# Patient Record
Sex: FEMALE | Race: WHITE | NOT HISPANIC OR LATINO | Employment: OTHER | URBAN - METROPOLITAN AREA
[De-identification: names, ages, dates, MRNs, and addresses within clinical notes are randomized per-mention and may not be internally consistent; named-entity substitution may affect disease eponyms.]

---

## 2017-02-14 ENCOUNTER — TRANSCRIBE ORDERS (OUTPATIENT)
Dept: ADMINISTRATIVE | Facility: HOSPITAL | Age: 62
End: 2017-02-14

## 2017-02-14 DIAGNOSIS — Z78.0 POSTMENOPAUSAL: Primary | ICD-10-CM

## 2017-02-20 ENCOUNTER — HOSPITAL ENCOUNTER (OUTPATIENT)
Dept: RADIOLOGY | Age: 62
Discharge: HOME | End: 2017-02-20
Payer: COMMERCIAL

## 2017-02-20 DIAGNOSIS — Z12.31 ENCOUNTER FOR SCREENING MAMMOGRAM FOR MALIGNANT NEOPLASM OF BREAST: ICD-10-CM

## 2017-02-20 PROCEDURE — G0202 SCR MAMMO BI INCL CAD: HCPCS

## 2017-02-23 ENCOUNTER — HOSPITAL ENCOUNTER (OUTPATIENT)
Dept: RADIOLOGY | Age: 62
Discharge: HOME | End: 2017-02-23
Payer: COMMERCIAL

## 2017-02-23 DIAGNOSIS — Z78.0 POSTMENOPAUSAL: ICD-10-CM

## 2017-02-23 PROCEDURE — 77080 DXA BONE DENSITY AXIAL: CPT

## 2017-03-30 ENCOUNTER — OFFICE VISIT (OUTPATIENT)
Dept: URGENT CARE | Age: 62
End: 2017-03-30
Payer: COMMERCIAL

## 2017-03-30 PROCEDURE — G0382 LEV 3 HOSP TYPE B ED VISIT: HCPCS

## 2017-03-30 PROCEDURE — S9083 URGENT CARE CENTER GLOBAL: HCPCS

## 2017-08-09 ENCOUNTER — OFFICE VISIT (OUTPATIENT)
Dept: URGENT CARE | Age: 62
End: 2017-08-09
Payer: COMMERCIAL

## 2017-08-09 PROCEDURE — G0382 LEV 3 HOSP TYPE B ED VISIT: HCPCS | Performed by: FAMILY MEDICINE

## 2017-08-09 PROCEDURE — S9083 URGENT CARE CENTER GLOBAL: HCPCS | Performed by: FAMILY MEDICINE

## 2018-01-09 ENCOUNTER — OFFICE VISIT (OUTPATIENT)
Dept: URGENT CARE | Age: 63
End: 2018-01-09
Payer: COMMERCIAL

## 2018-01-09 PROCEDURE — S9083 URGENT CARE CENTER GLOBAL: HCPCS | Performed by: FAMILY MEDICINE

## 2018-01-09 PROCEDURE — G0382 LEV 3 HOSP TYPE B ED VISIT: HCPCS | Performed by: FAMILY MEDICINE

## 2018-01-10 NOTE — PROGRESS NOTES
Assessment   1  Acute upper respiratory infection (465 9) (J06 9)    Plan   Acute upper respiratory infection    · Azithromycin 250 MG Oral Tablet (Zithromax Z-[place]); TAKE 2 TABLETS ON DAY 1    THEN TAKE 1 TABLET A DAY FOR 4 DAYS    Discussion/Summary   Discussion Summary:    Z-pac as directed 2 initially then 1 daily until finished ( please take probiotics)  medication as needed ( try Mucinex DM)  albuterol inhaler as needed  Tylenol as needed  follow-up with family physician as needed  Medication Side Effects Reviewed: Possible side effects of new medications were reviewed with the patient/guardian today  Understands and agrees with treatment plan: The treatment plan was reviewed with the patient/guardian  The patient/guardian understands and agrees with the treatment plan    Counseling Documentation With Imm: The patient was counseled regarding  Follow Up Instructions: Follow Up with your Primary Care Provider in 7-10 days  If your symptoms worsen, go to the nearest [place] [address] Emergency Department  Chief Complaint   1  Cold Symptoms  Chief Complaint Free Text Note Form: c/o nonproductive cough and chest congestion x 1 week , with use of OTC medicaitons-robitussin , tylneol prn  History of Present Illness   HPI: Congestion and cough for the past week - getting worse    Hospital Based Practices Required Assessment:      Pain Assessment      the patient states they do not have pain  Abuse And Domestic Violence Screen       Yes, the patient is safe at home  -- The patient states no one is hurting them  Depression And Suicide Screen  No, the patient has not had thoughts of hurting themself  No, the patient has not felt depressed in the past 7 days  Prefered Language is  english  Review of Systems   Focused-Female:      Constitutional: as noted in HPI       ENT: nasal discharge, but-- as noted in HPI        Cardiovascular: no complaints of slow or fast heart rate, no chest pain, no palpitations, no leg claudication or lower extremity edema  Respiratory: cough, but-- as noted in HPI  Breasts: no complaints of breast pain, breast lump or nipple discharge  Gastrointestinal: no complaints of abdominal pain, no constipation, no nausea or diarrhea, no vomiting, no bloody stools  Genitourinary: no complaints of dysuria, no incontinence, no pelvic pain, no dysmenorrhea, no vaginal discharge or abnormal vaginal bleeding  Musculoskeletal: no complaints of arthralgia, no myalgia, no joint swelling or stiffness, no limb pain or swelling  Integumentary: no complaints of skin rash or lesion, no itching or dry skin, no skin wounds  Neurological: no complaints of headache, no confusion, no numbness or tingling, no dizziness or fainting  ROS Reviewed:    ROS reviewed  Active Problems   1  Acute bronchitis, unspecified organism (466 0) (J20 9)   2  Acute sinusitis (461 9) (J01 90)   3  Anemia (285 9) (D64 9)   4  Asthmatic bronchitis (493 90) (J45 909)   5  Hyperlipidemia (272 4) (E78 5)   6  Hypertension (401 9) (I10)    Past Medical History   1  History of Encounter for routine gynecological examination (V72 31) (Z01 419)   2  History of Encounter for screening mammogram for malignant neoplasm of breast     (V76 12) (Z12 31)   3  History of allergic rhinitis (V12 69) (Z87 09)   4  History of Impaired fasting glucose (790 21) (R73 01)  Active Problems And Past Medical History Reviewed: The active problems and past medical history were reviewed and updated today  Family History   Mother    1  Family history of Mother  At Age 67  Father    2  Family history of Father  At Age 79  Family History    3  Family history of Breast Cancer (V16 3)   4  Family history of Diabetes Mellitus (V18 0)  Family History Reviewed: The family history was reviewed and updated today         Social History    · Denied: History of Alcohol Use (History)   · Denied: History of Caffeine Use   · Denied: History of Drug Use   · Never A Smoker   · Uses Safety Equipment - Seatbelts  Social History Reviewed: The social history was reviewed and updated today  The social history was reviewed and is unchanged  Surgical History   Surgical History Reviewed: The surgical history was reviewed and updated today  Current Meds    1  Aldactone 100 MG Oral Tablet; TAKE 1 TABLET TWICE DAILY; Therapy: (Recorded:08May2012) to Recorded   2  Lipitor 10 MG Oral Tablet; TAKE 1 TABLET DAILY AT BEDTIME; Therapy: (Recorded:08May2012) to Recorded   3  Nadolol 20 MG Oral Tablet; TAKE 1 TABLET DAILY; Therapy: (Federico Randhawa) to Recorded   4  Ventolin  (90 Base) MCG/ACT Inhalation Aerosol Solution; INHALE 2 PUFFS     EVERY 4 HOURS AS NEEDED; Therapy: [id number] to (Last Rx:30Mar2017)  Requested for: [id number] Ordered  Medication List Reviewed: The medication list was reviewed and updated today  Allergies   1  Sulfa Drugs   2  Augmentin TABS   3  Cefzil TABS    Vitals   Signs   Recorded: 01MAC5579 03:51PM   Temperature: 98 8 F, Temporal  Heart Rate: 90  Pulse Quality: Normal  Respiration: 20  Systolic: 579  Diastolic: 70  Weight: 527 lb   BMI Calculated: 43 93  BSA Calculated: 2 12  O2 Saturation: 98  LMP:   Pain Scale: 0    Physical Exam        Constitutional      General appearance: No acute distress, well appearing and well nourished  Ears, Nose, Mouth, and Throat      External inspection of ears and nose: Normal        Otoscopic examination: Tympanic membranes translucent with normal light reflex  Canals patent without erythema  -- slight nasal congestion  -- injection of the oropharynx  Pulmonary      Respiratory effort: No increased work of breathing or signs of respiratory distress  Auscultation of lungs: Clear to auscultation         Cardiovascular      Auscultation of heart: Normal rate and rhythm, normal S1 and S2, without murmurs  Lymphatic      Palpation of lymph nodes in neck: No lymphadenopathy  Skin good color and turgor  Neurologic grossly intact, no nuchal rigidity        Psychiatric      Orientation to person, place, and time: Normal        Mood and affect: Normal        Signatures    Electronically signed by : Mary Carmen Mercer DO; Jan 9 2018  4:03PM EST                       (Author)

## 2018-01-23 VITALS
SYSTOLIC BLOOD PRESSURE: 145 MMHG | WEIGHT: 248 LBS | TEMPERATURE: 98.8 F | DIASTOLIC BLOOD PRESSURE: 70 MMHG | RESPIRATION RATE: 20 BRPM | OXYGEN SATURATION: 98 % | HEART RATE: 90 BPM

## 2018-02-22 ENCOUNTER — TRANSCRIBE ORDERS (OUTPATIENT)
Dept: RADIOLOGY | Facility: CLINIC | Age: 63
End: 2018-02-22

## 2018-02-26 ENCOUNTER — HOSPITAL ENCOUNTER (OUTPATIENT)
Dept: RADIOLOGY | Age: 63
Discharge: HOME | End: 2018-02-26
Payer: COMMERCIAL

## 2018-02-26 DIAGNOSIS — Z12.31 ENCOUNTER FOR SCREENING MAMMOGRAM FOR MALIGNANT NEOPLASM OF BREAST: ICD-10-CM

## 2018-02-26 PROCEDURE — 77063 BREAST TOMOSYNTHESIS BI: CPT

## 2018-02-26 PROCEDURE — 77067 SCR MAMMO BI INCL CAD: CPT

## 2018-12-03 ENCOUNTER — OFFICE VISIT (OUTPATIENT)
Dept: CARDIOLOGY CLINIC | Facility: CLINIC | Age: 63
End: 2018-12-03
Payer: COMMERCIAL

## 2018-12-03 VITALS
HEIGHT: 62 IN | RESPIRATION RATE: 18 BRPM | HEART RATE: 70 BPM | WEIGHT: 260 LBS | SYSTOLIC BLOOD PRESSURE: 130 MMHG | DIASTOLIC BLOOD PRESSURE: 80 MMHG | BODY MASS INDEX: 47.84 KG/M2

## 2018-12-03 DIAGNOSIS — E78.5 HYPERLIPIDEMIA, UNSPECIFIED HYPERLIPIDEMIA TYPE: ICD-10-CM

## 2018-12-03 DIAGNOSIS — R00.2 PALPITATION: ICD-10-CM

## 2018-12-03 DIAGNOSIS — I10 ESSENTIAL HYPERTENSION: Primary | ICD-10-CM

## 2018-12-03 PROCEDURE — 99213 OFFICE O/P EST LOW 20 MIN: CPT | Performed by: INTERNAL MEDICINE

## 2018-12-03 RX ORDER — ALBUTEROL SULFATE 90 UG/1
2 AEROSOL, METERED RESPIRATORY (INHALATION) EVERY 4 HOURS PRN
COMMUNITY
Start: 2017-03-30 | End: 2019-08-12 | Stop reason: ALTCHOICE

## 2018-12-03 RX ORDER — HYDROXYZINE HYDROCHLORIDE 25 MG/1
25 TABLET, FILM COATED ORAL DAILY PRN
COMMUNITY
Start: 2018-07-13 | End: 2019-04-11 | Stop reason: ALTCHOICE

## 2018-12-03 RX ORDER — ATORVASTATIN CALCIUM 10 MG/1
10 TABLET, FILM COATED ORAL
COMMUNITY
Start: 2011-08-19 | End: 2019-08-12 | Stop reason: SDUPTHER

## 2018-12-03 RX ORDER — NADOLOL 20 MG/1
TABLET ORAL
COMMUNITY
Start: 2018-10-15 | End: 2019-08-12 | Stop reason: SDUPTHER

## 2018-12-03 RX ORDER — HYDROCHLOROTHIAZIDE 25 MG/1
25 TABLET ORAL AS NEEDED
COMMUNITY
Start: 2013-10-31 | End: 2019-08-12 | Stop reason: SDUPTHER

## 2018-12-03 RX ORDER — SPIRONOLACTONE 100 MG/1
TABLET, FILM COATED ORAL
COMMUNITY
Start: 2018-10-21 | End: 2018-12-03

## 2018-12-03 RX ORDER — SPIRONOLACTONE 100 MG/1
1 TABLET, FILM COATED ORAL 2 TIMES DAILY
COMMUNITY
End: 2019-08-12 | Stop reason: SDUPTHER

## 2018-12-03 NOTE — LETTER
December 3, 2018     Lucia Moe, [address] [address]    Patient: Asmita Ramos   YOB: 1955   Date of Visit: 12/3/2018       Dear Dr Yo November: Thank you for referring Yonas Ferrell to me for evaluation  Below are my notes for this consultation  If you have questions, please do not hesitate to call me  I look forward to following your patient along with you           Sincerely,        Mary Ochoa MD        CC: No Recipients

## 2018-12-03 NOTE — PROGRESS NOTES
Assessment/Plan:    No problem-specific Assessment & Plan notes found for this encounter  Diagnoses and all orders for this visit:    Essential hypertension    Hyperlipidemia, unspecified hyperlipidemia type    Palpitation    Other orders  -     spironolactone (ALDACTONE) 100 mg tablet; Take 1 tablet by mouth 2 (two) times a day  -     atorvastatin (LIPITOR) 10 mg tablet; Take 10 mg by mouth  -     hydrochlorothiazide (HYDRODIURIL) 25 mg tablet; Take 25 mg by mouth  -     hydrOXYzine HCL (ATARAX) 25 mg tablet; Take 25 mg by mouth daily as needed  -     nadolol (CORGARD) 20 mg tablet;   -     Discontinue: spironolactone (ALDACTONE) 100 mg tablet;   -     albuterol (VENTOLIN HFA) 90 mcg/act inhaler; Inhale 2 puffs every 4 (four) hours as needed          Subjective:  Feels well but for some arthritic pain  Patient ID: Belkys Becker is a 58 y o  female  Patient presented to this office for cardiac follow-up  She has a history of hypertension and hyperlipidemia as well as symptoms of palpitations related to possibly sinus tachycardia  She denies any symptoms of chest pain, shortness of breath, palpitations, dizziness or lightheadedness  She has no leg edema  Hypertension   Pertinent negatives include no chest pain, palpitations or shortness of breath  Hyperlipidemia   Pertinent negatives include no chest pain or shortness of breath  The following portions of the patient's history were reviewed and updated as appropriate: allergies, current medications, past family history, past medical history, past social history, past surgical history and problem list     Review of Systems   Respiratory: Negative for apnea, cough, chest tightness, shortness of breath and wheezing  Cardiovascular: Negative for chest pain, palpitations and leg swelling  Gastrointestinal: Negative for abdominal pain  Neurological: Negative for dizziness and light-headedness           Objective:  Stable cardiac-wise  /80   Pulse 70   Resp 18   Ht 5' 2" (1 575 m)   Wt 118 kg (260 lb)   BMI 47 55 kg/m²          Physical Exam   Constitutional: She is oriented to person, place, and time  She appears well-developed and well-nourished  No distress  HENT:   Head: Normocephalic and atraumatic  Neck: Normal range of motion  Neck supple  No JVD present  No thyromegaly present  Cardiovascular: Normal rate, regular rhythm, S1 normal and S2 normal   Exam reveals no gallop and no friction rub  No murmur heard  Pulmonary/Chest: Effort normal  No respiratory distress  She has no wheezes  She has no rales  She exhibits no tenderness  Abdominal: Soft  Musculoskeletal: She exhibits no edema  Neurological: She is alert and oriented to person, place, and time  Skin: Skin is warm and dry  She is not diaphoretic  Vitals reviewed

## 2019-04-11 ENCOUNTER — OFFICE VISIT (OUTPATIENT)
Dept: CARDIOLOGY CLINIC | Facility: CLINIC | Age: 64
End: 2019-04-11
Payer: COMMERCIAL

## 2019-04-11 VITALS
RESPIRATION RATE: 18 BRPM | BODY MASS INDEX: 48.69 KG/M2 | HEART RATE: 70 BPM | DIASTOLIC BLOOD PRESSURE: 80 MMHG | HEIGHT: 62 IN | SYSTOLIC BLOOD PRESSURE: 125 MMHG | WEIGHT: 264.6 LBS

## 2019-04-11 DIAGNOSIS — I10 ESSENTIAL HYPERTENSION: ICD-10-CM

## 2019-04-11 DIAGNOSIS — E78.2 MIXED HYPERLIPIDEMIA: Primary | ICD-10-CM

## 2019-04-11 DIAGNOSIS — R00.2 PALPITATION: ICD-10-CM

## 2019-04-11 PROCEDURE — 99213 OFFICE O/P EST LOW 20 MIN: CPT | Performed by: INTERNAL MEDICINE

## 2019-04-13 ENCOUNTER — APPOINTMENT (OUTPATIENT)
Dept: LAB | Age: 64
End: 2019-04-13
Payer: COMMERCIAL

## 2019-04-13 DIAGNOSIS — R00.2 PALPITATION: ICD-10-CM

## 2019-04-13 DIAGNOSIS — E78.5 HYPERLIPIDEMIA, UNSPECIFIED HYPERLIPIDEMIA TYPE: ICD-10-CM

## 2019-04-13 DIAGNOSIS — I10 ESSENTIAL HYPERTENSION: ICD-10-CM

## 2019-04-13 LAB
ALBUMIN SERPL BCP-MCNC: 3.5 G/DL (ref 3.5–5)
ALP SERPL-CCNC: 79 U/L (ref 46–116)
ALT SERPL W P-5'-P-CCNC: 16 U/L (ref 12–78)
ANION GAP SERPL CALCULATED.3IONS-SCNC: 2 MMOL/L (ref 4–13)
AST SERPL W P-5'-P-CCNC: 13 U/L (ref 5–45)
BASOPHILS # BLD AUTO: 0.09 THOUSANDS/ΜL (ref 0–0.1)
BASOPHILS NFR BLD AUTO: 2 % (ref 0–1)
BILIRUB SERPL-MCNC: 0.54 MG/DL (ref 0.2–1)
BUN SERPL-MCNC: 15 MG/DL (ref 5–25)
CALCIUM SERPL-MCNC: 8 MG/DL (ref 8.3–10.1)
CHLORIDE SERPL-SCNC: 107 MMOL/L (ref 100–108)
CHOLEST SERPL-MCNC: 128 MG/DL (ref 50–200)
CO2 SERPL-SCNC: 27 MMOL/L (ref 21–32)
CREAT SERPL-MCNC: 0.89 MG/DL (ref 0.6–1.3)
EOSINOPHIL # BLD AUTO: 0.1 THOUSAND/ΜL (ref 0–0.61)
EOSINOPHIL NFR BLD AUTO: 2 % (ref 0–6)
ERYTHROCYTE [DISTWIDTH] IN BLOOD BY AUTOMATED COUNT: 12.9 % (ref 11.6–15.1)
GFR SERPL CREATININE-BSD FRML MDRD: 69 ML/MIN/1.73SQ M
GLUCOSE P FAST SERPL-MCNC: 96 MG/DL (ref 65–99)
HCT VFR BLD AUTO: 44.1 % (ref 34.8–46.1)
HDLC SERPL-MCNC: 46 MG/DL (ref 40–60)
HGB BLD-MCNC: 14 G/DL (ref 11.5–15.4)
IMM GRANULOCYTES # BLD AUTO: 0.01 THOUSAND/UL (ref 0–0.2)
IMM GRANULOCYTES NFR BLD AUTO: 0 % (ref 0–2)
LDLC SERPL CALC-MCNC: 68 MG/DL (ref 0–100)
LYMPHOCYTES # BLD AUTO: 1.81 THOUSANDS/ΜL (ref 0.6–4.47)
LYMPHOCYTES NFR BLD AUTO: 34 % (ref 14–44)
MCH RBC QN AUTO: 29.2 PG (ref 26.8–34.3)
MCHC RBC AUTO-ENTMCNC: 31.7 G/DL (ref 31.4–37.4)
MCV RBC AUTO: 92 FL (ref 82–98)
MONOCYTES # BLD AUTO: 0.53 THOUSAND/ΜL (ref 0.17–1.22)
MONOCYTES NFR BLD AUTO: 10 % (ref 4–12)
NEUTROPHILS # BLD AUTO: 2.75 THOUSANDS/ΜL (ref 1.85–7.62)
NEUTS SEG NFR BLD AUTO: 52 % (ref 43–75)
NRBC BLD AUTO-RTO: 0 /100 WBCS
PLATELET # BLD AUTO: 215 THOUSANDS/UL (ref 149–390)
PMV BLD AUTO: 11.3 FL (ref 8.9–12.7)
POTASSIUM SERPL-SCNC: 4.2 MMOL/L (ref 3.5–5.3)
PROT SERPL-MCNC: 6.6 G/DL (ref 6.4–8.2)
RBC # BLD AUTO: 4.79 MILLION/UL (ref 3.81–5.12)
SODIUM SERPL-SCNC: 136 MMOL/L (ref 136–145)
TRIGL SERPL-MCNC: 68 MG/DL
TSH 30M P TRH SERPL-ACNC: 1.7 UIU/ML
TSH SERPL DL<=0.05 MIU/L-ACNC: 1.64 UIU/ML
WBC # BLD AUTO: 5.29 THOUSAND/UL (ref 4.31–10.16)

## 2019-04-13 PROCEDURE — 84443 ASSAY THYROID STIM HORMONE: CPT

## 2019-04-13 PROCEDURE — 80053 COMPREHEN METABOLIC PANEL: CPT

## 2019-04-13 PROCEDURE — 85025 COMPLETE CBC W/AUTO DIFF WBC: CPT

## 2019-04-13 PROCEDURE — 36415 COLL VENOUS BLD VENIPUNCTURE: CPT

## 2019-04-13 PROCEDURE — 80061 LIPID PANEL: CPT

## 2019-04-19 ENCOUNTER — HOSPITAL ENCOUNTER (OUTPATIENT)
Dept: RADIOLOGY | Age: 64
Discharge: HOME | End: 2019-04-19
Payer: COMMERCIAL

## 2019-04-19 VITALS — HEIGHT: 62 IN | WEIGHT: 264 LBS | BODY MASS INDEX: 48.58 KG/M2

## 2019-04-19 DIAGNOSIS — Z12.31 ENCOUNTER FOR SCREENING MAMMOGRAM FOR MALIGNANT NEOPLASM OF BREAST: ICD-10-CM

## 2019-04-19 PROCEDURE — 77063 BREAST TOMOSYNTHESIS BI: CPT

## 2019-04-19 PROCEDURE — 77067 SCR MAMMO BI INCL CAD: CPT

## 2019-08-12 ENCOUNTER — OFFICE VISIT (OUTPATIENT)
Dept: CARDIOLOGY CLINIC | Facility: CLINIC | Age: 64
End: 2019-08-12
Payer: COMMERCIAL

## 2019-08-12 VITALS
BODY MASS INDEX: 46.85 KG/M2 | RESPIRATION RATE: 18 BRPM | HEIGHT: 62 IN | DIASTOLIC BLOOD PRESSURE: 80 MMHG | WEIGHT: 254.6 LBS | HEART RATE: 60 BPM | SYSTOLIC BLOOD PRESSURE: 130 MMHG

## 2019-08-12 DIAGNOSIS — R00.2 PALPITATION: ICD-10-CM

## 2019-08-12 DIAGNOSIS — E78.2 MIXED HYPERLIPIDEMIA: ICD-10-CM

## 2019-08-12 DIAGNOSIS — I10 ESSENTIAL HYPERTENSION: Primary | ICD-10-CM

## 2019-08-12 PROCEDURE — 99213 OFFICE O/P EST LOW 20 MIN: CPT | Performed by: INTERNAL MEDICINE

## 2019-08-12 RX ORDER — NADOLOL 20 MG/1
20 TABLET ORAL DAILY
Qty: 90 TABLET | Refills: 3 | Status: SHIPPED | OUTPATIENT
Start: 2019-08-12 | End: 2020-01-14 | Stop reason: SDUPTHER

## 2019-08-12 RX ORDER — HYDROCHLOROTHIAZIDE 25 MG/1
25 TABLET ORAL DAILY
Qty: 30 TABLET | Refills: 6 | Status: SHIPPED | OUTPATIENT
Start: 2019-08-12 | End: 2020-01-14 | Stop reason: SDUPTHER

## 2019-08-12 RX ORDER — SPIRONOLACTONE 100 MG/1
100 TABLET, FILM COATED ORAL 2 TIMES DAILY
Qty: 180 TABLET | Refills: 3 | Status: SHIPPED | OUTPATIENT
Start: 2019-08-12 | End: 2020-01-14 | Stop reason: SDUPTHER

## 2019-08-12 RX ORDER — ATORVASTATIN CALCIUM 10 MG/1
10 TABLET, FILM COATED ORAL DAILY
Qty: 90 TABLET | Refills: 3 | Status: SHIPPED | OUTPATIENT
Start: 2019-08-12 | End: 2020-01-14 | Stop reason: SDUPTHER

## 2019-08-12 NOTE — ASSESSMENT & PLAN NOTE
History of palpitation, stable the patient is currently asymptomatic  Will continue nadolol at 20 mg daily

## 2019-08-12 NOTE — PROGRESS NOTES
Assessment/Plan:    Essential hypertension  Hypertension, stable and adequately controlled  We will continue present regimen of medications  Palpitation  History of palpitation, stable the patient is currently asymptomatic  Will continue nadolol at 20 mg daily  Hyperlipidemia  Hyperlipidemia, stable  We will continue atorvastatin 10 mg daily  Diagnoses and all orders for this visit:    Essential hypertension  -     atorvastatin (LIPITOR) 10 mg tablet; Take 1 tablet (10 mg total) by mouth daily  -     hydrochlorothiazide (HYDRODIURIL) 25 mg tablet; Take 1 tablet (25 mg total) by mouth daily  -     nadolol (CORGARD) 20 mg tablet; Take 1 tablet (20 mg total) by mouth daily  -     spironolactone (ALDACTONE) 100 mg tablet; Take 1 tablet (100 mg total) by mouth 2 (two) times a day    Mixed hyperlipidemia  -     atorvastatin (LIPITOR) 10 mg tablet; Take 1 tablet (10 mg total) by mouth daily  -     hydrochlorothiazide (HYDRODIURIL) 25 mg tablet; Take 1 tablet (25 mg total) by mouth daily  -     nadolol (CORGARD) 20 mg tablet; Take 1 tablet (20 mg total) by mouth daily  -     spironolactone (ALDACTONE) 100 mg tablet; Take 1 tablet (100 mg total) by mouth 2 (two) times a day    Palpitation          Subjective:  Feels well  Patient ID: Gayla Toure is a 58 y o  female  Patient presented to this office for the purpose of cardiac follow-up  She has a history of hypertension and hyperlipidemia as well as symptoms of palpitation  She has been feeling well from the cardiac standpoint denying any symptoms of chest pain, shortness of breath, palpitation, dizziness or lightheadedness  She has no significant leg edema        The following portions of the patient's history were reviewed and updated as appropriate: allergies, current medications, past family history, past medical history, past social history, past surgical history and problem list     Review of Systems   Respiratory: Negative for apnea, cough, chest tightness, shortness of breath and wheezing  Cardiovascular: Negative for chest pain, palpitations and leg swelling  Gastrointestinal: Negative for abdominal pain  Neurological: Negative for dizziness and light-headedness  Objective:  Stable cardiac-wise  /80 (BP Location: Right arm, Patient Position: Sitting)   Pulse 60   Resp 18   Ht 5' 2" (1 575 m)   Wt 115 kg (254 lb 9 6 oz)   BMI 46 57 kg/m²          Physical Exam   Constitutional: She is oriented to person, place, and time  She appears well-developed and well-nourished  No distress  HENT:   Head: Normocephalic and atraumatic  Neck: Normal range of motion  Neck supple  No JVD present  No thyromegaly present  Cardiovascular: Normal rate, regular rhythm, S1 normal and S2 normal  Exam reveals no gallop and no friction rub  No murmur heard  Pulmonary/Chest: Effort normal  No respiratory distress  She has no wheezes  She has no rales  She exhibits no tenderness  Abdominal: Soft  Musculoskeletal: She exhibits no edema  Neurological: She is alert and oriented to person, place, and time  Skin: Skin is warm and dry  She is not diaphoretic  Psychiatric: She has a normal mood and affect  Vitals reviewed

## 2019-08-12 NOTE — LETTER
August 12, 2019     Rocky Davidson, [address] [id number]    Patient: Luz Dickson   YOB: 1955   Date of Visit: 8/12/2019       Dear Dr Sona Castillo: Thank you for referring Meredith Florian to me for evaluation  Below are my notes for this consultation  If you have questions, please do not hesitate to call me  I look forward to following your patient along with you  Sincerely,        Ramakrishna Crane MD        CC: No Recipients  Ramakrishna Crane MD  8/12/2019 11:37 AM  Sign at close encounter  Assessment/Plan:    Essential hypertension  Hypertension, stable and adequately controlled  We will continue present regimen of medications  Palpitation  History of palpitation, stable the patient is currently asymptomatic  Will continue nadolol at 20 mg daily  Hyperlipidemia  Hyperlipidemia, stable  We will continue atorvastatin 10 mg daily  Diagnoses and all orders for this visit:    Essential hypertension  -     atorvastatin (LIPITOR) 10 mg tablet; Take 1 tablet (10 mg total) by mouth daily  -     hydrochlorothiazide (HYDRODIURIL) 25 mg tablet; Take 1 tablet (25 mg total) by mouth daily  -     nadolol (CORGARD) 20 mg tablet; Take 1 tablet (20 mg total) by mouth daily  -     spironolactone (ALDACTONE) 100 mg tablet; Take 1 tablet (100 mg total) by mouth 2 (two) times a day    Mixed hyperlipidemia  -     atorvastatin (LIPITOR) 10 mg tablet; Take 1 tablet (10 mg total) by mouth daily  -     hydrochlorothiazide (HYDRODIURIL) 25 mg tablet; Take 1 tablet (25 mg total) by mouth daily  -     nadolol (CORGARD) 20 mg tablet; Take 1 tablet (20 mg total) by mouth daily  -     spironolactone (ALDACTONE) 100 mg tablet; Take 1 tablet (100 mg total) by mouth 2 (two) times a day    Palpitation          Subjective:  Feels well  Patient ID: Luz Dickson is a 58 y o  female  Patient presented to this office for the purpose of cardiac follow-up    She has a history of hypertension and hyperlipidemia as well as symptoms of palpitation  She has been feeling well from the cardiac standpoint denying any symptoms of chest pain, shortness of breath, palpitation, dizziness or lightheadedness  She has no significant leg edema  The following portions of the patient's history were reviewed and updated as appropriate: allergies, current medications, past family history, past medical history, past social history, past surgical history and problem list     Review of Systems   Respiratory: Negative for apnea, cough, chest tightness, shortness of breath and wheezing  Cardiovascular: Negative for chest pain, palpitations and leg swelling  Gastrointestinal: Negative for abdominal pain  Neurological: Negative for dizziness and light-headedness  Objective:  Stable cardiac-wise  /80 (BP Location: Right arm, Patient Position: Sitting)   Pulse 60   Resp 18   Ht 5' 2" (1 575 m)   Wt 115 kg (254 lb 9 6 oz)   BMI 46 57 kg/m²           Physical Exam   Constitutional: She is oriented to person, place, and time  She appears well-developed and well-nourished  No distress  HENT:   Head: Normocephalic and atraumatic  Neck: Normal range of motion  Neck supple  No JVD present  No thyromegaly present  Cardiovascular: Normal rate, regular rhythm, S1 normal and S2 normal  Exam reveals no gallop and no friction rub  No murmur heard  Pulmonary/Chest: Effort normal  No respiratory distress  She has no wheezes  She has no rales  She exhibits no tenderness  Abdominal: Soft  Musculoskeletal: She exhibits no edema  Neurological: She is alert and oriented to person, place, and time  Skin: Skin is warm and dry  She is not diaphoretic  Psychiatric: She has a normal mood and affect  Vitals reviewed

## 2019-08-12 NOTE — LETTER
August 12, 2019     Waldo Andrea, [address] [id number]    Patient: Lei Santiago   YOB: 1955   Date of Visit: 8/12/2019       Dear Dr Nara Pak Recipients: Thank you for referring Jose Monique to me for evaluation  Below are my notes for this consultation  If you have questions, please do not hesitate to call me  I look forward to following your patient along with you  Sincerely,        Lei Ku MD        CC: No Recipients  Lei Ku MD  8/12/2019 11:37 AM  Sign at close encounter  Assessment/Plan:    Essential hypertension  Hypertension, stable and adequately controlled  We will continue present regimen of medications  Palpitation  History of palpitation, stable the patient is currently asymptomatic  Will continue nadolol at 20 mg daily  Hyperlipidemia  Hyperlipidemia, stable  We will continue atorvastatin 10 mg daily  Diagnoses and all orders for this visit:    Essential hypertension  -     atorvastatin (LIPITOR) 10 mg tablet; Take 1 tablet (10 mg total) by mouth daily  -     hydrochlorothiazide (HYDRODIURIL) 25 mg tablet; Take 1 tablet (25 mg total) by mouth daily  -     nadolol (CORGARD) 20 mg tablet; Take 1 tablet (20 mg total) by mouth daily  -     spironolactone (ALDACTONE) 100 mg tablet; Take 1 tablet (100 mg total) by mouth 2 (two) times a day    Mixed hyperlipidemia  -     atorvastatin (LIPITOR) 10 mg tablet; Take 1 tablet (10 mg total) by mouth daily  -     hydrochlorothiazide (HYDRODIURIL) 25 mg tablet; Take 1 tablet (25 mg total) by mouth daily  -     nadolol (CORGARD) 20 mg tablet; Take 1 tablet (20 mg total) by mouth daily  -     spironolactone (ALDACTONE) 100 mg tablet; Take 1 tablet (100 mg total) by mouth 2 (two) times a day    Palpitation          Subjective:  Feels well  Patient ID: Lei Santiago is a 58 y o  female  Patient presented to this office for the purpose of cardiac follow-up    She has a history of hypertension and hyperlipidemia as well as symptoms of palpitation  She has been feeling well from the cardiac standpoint denying any symptoms of chest pain, shortness of breath, palpitation, dizziness or lightheadedness  She has no significant leg edema  The following portions of the patient's history were reviewed and updated as appropriate: allergies, current medications, past family history, past medical history, past social history, past surgical history and problem list     Review of Systems   Respiratory: Negative for apnea, cough, chest tightness, shortness of breath and wheezing  Cardiovascular: Negative for chest pain, palpitations and leg swelling  Gastrointestinal: Negative for abdominal pain  Neurological: Negative for dizziness and light-headedness  Objective:  Stable cardiac-wise  /80 (BP Location: Right arm, Patient Position: Sitting)   Pulse 60   Resp 18   Ht 5' 2" (1 575 m)   Wt 115 kg (254 lb 9 6 oz)   BMI 46 57 kg/m²           Physical Exam   Constitutional: She is oriented to person, place, and time  She appears well-developed and well-nourished  No distress  HENT:   Head: Normocephalic and atraumatic  Neck: Normal range of motion  Neck supple  No JVD present  No thyromegaly present  Cardiovascular: Normal rate, regular rhythm, S1 normal and S2 normal  Exam reveals no gallop and no friction rub  No murmur heard  Pulmonary/Chest: Effort normal  No respiratory distress  She has no wheezes  She has no rales  She exhibits no tenderness  Abdominal: Soft  Musculoskeletal: She exhibits no edema  Neurological: She is alert and oriented to person, place, and time  Skin: Skin is warm and dry  She is not diaphoretic  Psychiatric: She has a normal mood and affect  Vitals reviewed

## 2020-01-08 ENCOUNTER — TRANSCRIBE ORDERS (OUTPATIENT)
Dept: ADMINISTRATIVE | Age: 65
End: 2020-01-08

## 2020-01-08 ENCOUNTER — APPOINTMENT (OUTPATIENT)
Dept: LAB | Age: 65
End: 2020-01-08
Payer: MEDICARE

## 2020-01-08 DIAGNOSIS — R73.03 BORDERLINE DIABETES MELLITUS: ICD-10-CM

## 2020-01-08 DIAGNOSIS — E28.2 POLYCYSTIC OVARIES: Primary | ICD-10-CM

## 2020-01-08 DIAGNOSIS — E28.2 POLYCYSTIC OVARIES: ICD-10-CM

## 2020-01-08 LAB
ALBUMIN SERPL BCP-MCNC: 3.5 G/DL (ref 3.5–5)
ALP SERPL-CCNC: 86 U/L (ref 46–116)
ALT SERPL W P-5'-P-CCNC: 16 U/L (ref 12–78)
ANION GAP SERPL CALCULATED.3IONS-SCNC: 5 MMOL/L (ref 4–13)
AST SERPL W P-5'-P-CCNC: 15 U/L (ref 5–45)
BILIRUB SERPL-MCNC: 0.59 MG/DL (ref 0.2–1)
BUN SERPL-MCNC: 15 MG/DL (ref 5–25)
CALCIUM SERPL-MCNC: 9.2 MG/DL (ref 8.3–10.1)
CHLORIDE SERPL-SCNC: 107 MMOL/L (ref 100–108)
CHOLEST SERPL-MCNC: 125 MG/DL (ref 50–200)
CO2 SERPL-SCNC: 26 MMOL/L (ref 21–32)
CREAT SERPL-MCNC: 0.91 MG/DL (ref 0.6–1.3)
EST. AVERAGE GLUCOSE BLD GHB EST-MCNC: 111 MG/DL
GFR SERPL CREATININE-BSD FRML MDRD: 66 ML/MIN/1.73SQ M
GLUCOSE P FAST SERPL-MCNC: 98 MG/DL (ref 65–99)
HBA1C MFR BLD: 5.5 % (ref 4.2–6.3)
HDLC SERPL-MCNC: 41 MG/DL
LDLC SERPL CALC-MCNC: 67 MG/DL (ref 0–100)
NONHDLC SERPL-MCNC: 84 MG/DL
POTASSIUM SERPL-SCNC: 3.8 MMOL/L (ref 3.5–5.3)
PROT SERPL-MCNC: 7.1 G/DL (ref 6.4–8.2)
SODIUM SERPL-SCNC: 138 MMOL/L (ref 136–145)
TRIGL SERPL-MCNC: 85 MG/DL

## 2020-01-08 PROCEDURE — 83036 HEMOGLOBIN GLYCOSYLATED A1C: CPT

## 2020-01-08 PROCEDURE — 80061 LIPID PANEL: CPT

## 2020-01-08 PROCEDURE — 36415 COLL VENOUS BLD VENIPUNCTURE: CPT

## 2020-01-08 PROCEDURE — 80053 COMPREHEN METABOLIC PANEL: CPT

## 2020-01-14 ENCOUNTER — OFFICE VISIT (OUTPATIENT)
Dept: CARDIOLOGY CLINIC | Facility: CLINIC | Age: 65
End: 2020-01-14
Payer: MEDICARE

## 2020-01-14 VITALS
BODY MASS INDEX: 46.45 KG/M2 | RESPIRATION RATE: 18 BRPM | SYSTOLIC BLOOD PRESSURE: 135 MMHG | HEIGHT: 62 IN | DIASTOLIC BLOOD PRESSURE: 80 MMHG | WEIGHT: 252.4 LBS | HEART RATE: 70 BPM | OXYGEN SATURATION: 95 %

## 2020-01-14 DIAGNOSIS — R00.2 PALPITATION: ICD-10-CM

## 2020-01-14 DIAGNOSIS — I10 ESSENTIAL HYPERTENSION: Primary | ICD-10-CM

## 2020-01-14 DIAGNOSIS — E78.2 MIXED HYPERLIPIDEMIA: ICD-10-CM

## 2020-01-14 PROCEDURE — 99213 OFFICE O/P EST LOW 20 MIN: CPT | Performed by: INTERNAL MEDICINE

## 2020-01-14 RX ORDER — HYDROCHLOROTHIAZIDE 25 MG/1
25 TABLET ORAL DAILY
Qty: 30 TABLET | Refills: 6 | Status: SHIPPED | OUTPATIENT
Start: 2020-01-14 | End: 2020-12-28 | Stop reason: SDUPTHER

## 2020-01-14 RX ORDER — NADOLOL 20 MG/1
20 TABLET ORAL DAILY
Qty: 90 TABLET | Refills: 3 | Status: SHIPPED | OUTPATIENT
Start: 2020-01-14 | End: 2020-12-28 | Stop reason: SDUPTHER

## 2020-01-14 RX ORDER — ATORVASTATIN CALCIUM 10 MG/1
10 TABLET, FILM COATED ORAL DAILY
Qty: 90 TABLET | Refills: 3 | Status: SHIPPED | OUTPATIENT
Start: 2020-01-14 | End: 2020-12-28 | Stop reason: SDUPTHER

## 2020-01-14 RX ORDER — SPIRONOLACTONE 100 MG/1
100 TABLET, FILM COATED ORAL 2 TIMES DAILY
Qty: 180 TABLET | Refills: 3 | Status: SHIPPED | OUTPATIENT
Start: 2020-01-14 | End: 2020-12-28 | Stop reason: SDUPTHER

## 2020-01-14 NOTE — LETTER
January 14, 2020     Referral [address] [place] [id number]    Patient: Carolina Yuan   YOB: 1955   Date of Visit: 1/14/2020       Dear Dr Leonel Guy:    Thank you for referring Julio C Moreno to me for evaluation  Below are my notes for this consultation  If you have questions, please do not hesitate to call me  I look forward to following your patient along with you  Sincerely,        Pardeep Mejia MD        CC: DO Pardeep Peralta MD  1/14/2020 11:29 AM  Sign at close encounter  Assessment/Plan:    Essential hypertension  Hypertension, stable adequately controlled  We will continue present medications  Palpitation  History of palpitation, stable  The patient will continue nadolol at 20 mg daily  Hyperlipidemia  Hyperlipidemia, stable  The patient will continue atorvastatin at 10 mg daily  Diagnoses and all orders for this visit:    Essential hypertension  -     atorvastatin (LIPITOR) 10 mg tablet; Take 1 tablet (10 mg total) by mouth daily  -     hydrochlorothiazide (HYDRODIURIL) 25 mg tablet; Take 1 tablet (25 mg total) by mouth daily  -     nadolol (CORGARD) 20 mg tablet; Take 1 tablet (20 mg total) by mouth daily  -     spironolactone (ALDACTONE) 100 mg tablet; Take 1 tablet (100 mg total) by mouth 2 (two) times a day    Mixed hyperlipidemia  -     atorvastatin (LIPITOR) 10 mg tablet; Take 1 tablet (10 mg total) by mouth daily  -     hydrochlorothiazide (HYDRODIURIL) 25 mg tablet; Take 1 tablet (25 mg total) by mouth daily  -     nadolol (CORGARD) 20 mg tablet; Take 1 tablet (20 mg total) by mouth daily  -     spironolactone (ALDACTONE) 100 mg tablet; Take 1 tablet (100 mg total) by mouth 2 (two) times a day    Palpitation          Subjective:  Feels generally well  Patient ID: Carolina Yuan is a 71 y o  female  The patient presented to this office for the purpose of cardiac follow-up    She has a known history of hypertension hyperlipidemia symptoms of palpitation  She has been feeling well standpoint denying any symptoms chest pain shortness of palpitation, dizziness or lightheadedness  She has no significant leg edema  The following portions of the patient's history were reviewed and updated as appropriate: allergies, current medications, past family history, past medical history, past social history, past surgical history and problem list     Review of Systems   Respiratory: Negative for apnea, cough, chest tightness, shortness of breath and wheezing  Cardiovascular: Negative for chest pain, palpitations and leg swelling  Gastrointestinal: Negative for abdominal pain  Neurological: Negative for dizziness and light-headedness  Hematological: Negative  Psychiatric/Behavioral: Negative  Objective:  Stable cardiac-wise  /80 (BP Location: Right arm, Patient Position: Sitting)   Pulse 70   Resp 18   Ht 5' 2" (1 575 m)   Wt 114 kg (252 lb 6 4 oz) Comment: with shoes  SpO2 95%   BMI 46 16 kg/m²           Physical Exam   Constitutional: She is oriented to person, place, and time  She appears well-developed and well-nourished  No distress  HENT:   Head: Normocephalic and atraumatic  Neck: Normal range of motion  Neck supple  No JVD present  No thyromegaly present  Cardiovascular: Normal rate, regular rhythm, S1 normal and S2 normal  Exam reveals no gallop and no friction rub  No murmur heard  Pulmonary/Chest: Effort normal  No respiratory distress  She has no wheezes  She has no rales  She exhibits no tenderness  Abdominal: Soft  Musculoskeletal: She exhibits no edema  Neurological: She is alert and oriented to person, place, and time  Skin: Skin is warm and dry  She is not diaphoretic  Psychiatric: She has a normal mood and affect  Vitals reviewed

## 2020-01-14 NOTE — PROGRESS NOTES
Assessment/Plan:    Essential hypertension  Hypertension, stable adequately controlled  We will continue present medications  Palpitation  History of palpitation, stable  The patient will continue nadolol at 20 mg daily  Hyperlipidemia  Hyperlipidemia, stable  The patient will continue atorvastatin at 10 mg daily  Diagnoses and all orders for this visit:    Essential hypertension  -     atorvastatin (LIPITOR) 10 mg tablet; Take 1 tablet (10 mg total) by mouth daily  -     hydrochlorothiazide (HYDRODIURIL) 25 mg tablet; Take 1 tablet (25 mg total) by mouth daily  -     nadolol (CORGARD) 20 mg tablet; Take 1 tablet (20 mg total) by mouth daily  -     spironolactone (ALDACTONE) 100 mg tablet; Take 1 tablet (100 mg total) by mouth 2 (two) times a day    Mixed hyperlipidemia  -     atorvastatin (LIPITOR) 10 mg tablet; Take 1 tablet (10 mg total) by mouth daily  -     hydrochlorothiazide (HYDRODIURIL) 25 mg tablet; Take 1 tablet (25 mg total) by mouth daily  -     nadolol (CORGARD) 20 mg tablet; Take 1 tablet (20 mg total) by mouth daily  -     spironolactone (ALDACTONE) 100 mg tablet; Take 1 tablet (100 mg total) by mouth 2 (two) times a day    Palpitation          Subjective:  Feels generally well  Patient ID: Charlie Osuna is a 71 y o  female  The patient presented to this office for the purpose of cardiac follow-up  She has a known history of hypertension hyperlipidemia symptoms of palpitation  She has been feeling well standpoint denying any symptoms chest pain shortness of palpitation, dizziness or lightheadedness  She has no significant leg edema  The following portions of the patient's history were reviewed and updated as appropriate: allergies, current medications, past family history, past medical history, past social history, past surgical history and problem list     Review of Systems   Respiratory: Negative for apnea, cough, chest tightness, shortness of breath and wheezing  Cardiovascular: Negative for chest pain, palpitations and leg swelling  Gastrointestinal: Negative for abdominal pain  Neurological: Negative for dizziness and light-headedness  Hematological: Negative  Psychiatric/Behavioral: Negative  Objective:  Stable cardiac-wise  /80 (BP Location: Right arm, Patient Position: Sitting)   Pulse 70   Resp 18   Ht 5' 2" (1 575 m)   Wt 114 kg (252 lb 6 4 oz) Comment: with shoes  SpO2 95%   BMI 46 16 kg/m²          Physical Exam   Constitutional: She is oriented to person, place, and time  She appears well-developed and well-nourished  No distress  HENT:   Head: Normocephalic and atraumatic  Neck: Normal range of motion  Neck supple  No JVD present  No thyromegaly present  Cardiovascular: Normal rate, regular rhythm, S1 normal and S2 normal  Exam reveals no gallop and no friction rub  No murmur heard  Pulmonary/Chest: Effort normal  No respiratory distress  She has no wheezes  She has no rales  She exhibits no tenderness  Abdominal: Soft  Musculoskeletal: She exhibits no edema  Neurological: She is alert and oriented to person, place, and time  Skin: Skin is warm and dry  She is not diaphoretic  Psychiatric: She has a normal mood and affect  Vitals reviewed

## 2020-02-07 ENCOUNTER — TRANSCRIBE ORDERS (OUTPATIENT)
Dept: ADMINISTRATIVE | Age: 65
End: 2020-02-07

## 2020-02-07 ENCOUNTER — APPOINTMENT (OUTPATIENT)
Dept: LAB | Age: 65
End: 2020-02-07
Payer: MEDICARE

## 2020-02-07 DIAGNOSIS — R68.83 CHILLS: Primary | ICD-10-CM

## 2020-02-07 DIAGNOSIS — R68.83 CHILLS: ICD-10-CM

## 2020-02-07 LAB
BASOPHILS # BLD AUTO: 0.1 THOUSANDS/ΜL (ref 0–0.1)
BASOPHILS NFR BLD AUTO: 2 % (ref 0–1)
EOSINOPHIL # BLD AUTO: 0.03 THOUSAND/ΜL (ref 0–0.61)
EOSINOPHIL NFR BLD AUTO: 1 % (ref 0–6)
ERYTHROCYTE [DISTWIDTH] IN BLOOD BY AUTOMATED COUNT: 13.1 % (ref 11.6–15.1)
HCT VFR BLD AUTO: 47.6 % (ref 34.8–46.1)
HGB BLD-MCNC: 15.5 G/DL (ref 11.5–15.4)
IMM GRANULOCYTES # BLD AUTO: 0.02 THOUSAND/UL (ref 0–0.2)
IMM GRANULOCYTES NFR BLD AUTO: 0 % (ref 0–2)
LYMPHOCYTES # BLD AUTO: 1.8 THOUSANDS/ΜL (ref 0.6–4.47)
LYMPHOCYTES NFR BLD AUTO: 28 % (ref 14–44)
MCH RBC QN AUTO: 29.8 PG (ref 26.8–34.3)
MCHC RBC AUTO-ENTMCNC: 32.6 G/DL (ref 31.4–37.4)
MCV RBC AUTO: 91 FL (ref 82–98)
MONOCYTES # BLD AUTO: 0.52 THOUSAND/ΜL (ref 0.17–1.22)
MONOCYTES NFR BLD AUTO: 8 % (ref 4–12)
NEUTROPHILS # BLD AUTO: 3.88 THOUSANDS/ΜL (ref 1.85–7.62)
NEUTS SEG NFR BLD AUTO: 61 % (ref 43–75)
NRBC BLD AUTO-RTO: 0 /100 WBCS
PLATELET # BLD AUTO: 241 THOUSANDS/UL (ref 149–390)
PMV BLD AUTO: 10.4 FL (ref 8.9–12.7)
RBC # BLD AUTO: 5.21 MILLION/UL (ref 3.81–5.12)
TSH SERPL DL<=0.05 MIU/L-ACNC: 1.4 UIU/ML (ref 0.36–3.74)
WBC # BLD AUTO: 6.35 THOUSAND/UL (ref 4.31–10.16)

## 2020-02-07 PROCEDURE — 84443 ASSAY THYROID STIM HORMONE: CPT

## 2020-02-07 PROCEDURE — 36415 COLL VENOUS BLD VENIPUNCTURE: CPT

## 2020-02-07 PROCEDURE — 85025 COMPLETE CBC W/AUTO DIFF WBC: CPT

## 2020-03-10 ENCOUNTER — HOSPITAL ENCOUNTER (EMERGENCY)
Facility: HOSPITAL | Age: 65
Discharge: HOME | End: 2020-03-10
Attending: EMERGENCY MEDICINE | Admitting: EMERGENCY MEDICINE
Payer: MEDICARE

## 2020-03-10 ENCOUNTER — ANESTHESIA (EMERGENCY)
Dept: PERIOP | Facility: HOSPITAL | Age: 65
End: 2020-03-10
Payer: MEDICARE

## 2020-03-10 ENCOUNTER — OFFICE VISIT (OUTPATIENT)
Dept: URGENT CARE | Age: 65
End: 2020-03-10
Payer: MEDICARE

## 2020-03-10 ENCOUNTER — ANESTHESIA EVENT (EMERGENCY)
Dept: PERIOP | Facility: HOSPITAL | Age: 65
End: 2020-03-10
Payer: MEDICARE

## 2020-03-10 ENCOUNTER — APPOINTMENT (EMERGENCY)
Dept: PERIOP | Facility: HOSPITAL | Age: 65
End: 2020-03-10
Payer: MEDICARE

## 2020-03-10 VITALS
RESPIRATION RATE: 18 BRPM | TEMPERATURE: 98 F | OXYGEN SATURATION: 97 % | DIASTOLIC BLOOD PRESSURE: 82 MMHG | HEART RATE: 92 BPM | SYSTOLIC BLOOD PRESSURE: 145 MMHG

## 2020-03-10 VITALS
BODY MASS INDEX: 45.44 KG/M2 | SYSTOLIC BLOOD PRESSURE: 132 MMHG | DIASTOLIC BLOOD PRESSURE: 72 MMHG | TEMPERATURE: 97.6 F | WEIGHT: 248.46 LBS | HEART RATE: 75 BPM | OXYGEN SATURATION: 100 % | RESPIRATION RATE: 16 BRPM

## 2020-03-10 DIAGNOSIS — R11.2 INTRACTABLE VOMITING WITH NAUSEA, UNSPECIFIED VOMITING TYPE: Primary | ICD-10-CM

## 2020-03-10 DIAGNOSIS — T18.128A FOOD IMPACTION OF ESOPHAGUS, INITIAL ENCOUNTER: Primary | ICD-10-CM

## 2020-03-10 DIAGNOSIS — R13.19 ESOPHAGEAL DYSPHAGIA: ICD-10-CM

## 2020-03-10 DIAGNOSIS — K22.2 ESOPHAGEAL OBSTRUCTION DUE TO FOOD IMPACTION: ICD-10-CM

## 2020-03-10 DIAGNOSIS — T18.128A ESOPHAGEAL OBSTRUCTION DUE TO FOOD IMPACTION: ICD-10-CM

## 2020-03-10 DIAGNOSIS — R10.13 EPIGASTRIC PAIN: ICD-10-CM

## 2020-03-10 DIAGNOSIS — T18.128A FOOD IMPACTION OF ESOPHAGUS, INITIAL ENCOUNTER: ICD-10-CM
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
## 2020-03-10 PROCEDURE — 96374 THER/PROPH/DIAG INJ IV PUSH: CPT

## 2020-03-10 PROCEDURE — 99213 OFFICE O/P EST LOW 20 MIN: CPT | Performed by: FAMILY MEDICINE

## 2020-03-10 PROCEDURE — 99284 EMERGENCY DEPT VISIT MOD MDM: CPT | Performed by: INTERNAL MEDICINE

## 2020-03-10 PROCEDURE — 43235 EGD DIAGNOSTIC BRUSH WASH: CPT | Performed by: INTERNAL MEDICINE

## 2020-03-10 PROCEDURE — 99284 EMERGENCY DEPT VISIT MOD MDM: CPT

## 2020-03-10 PROCEDURE — G0463 HOSPITAL OUTPT CLINIC VISIT: HCPCS | Performed by: FAMILY MEDICINE

## 2020-03-10 PROCEDURE — 99284 EMERGENCY DEPT VISIT MOD MDM: CPT | Performed by: EMERGENCY MEDICINE

## 2020-03-10 PROCEDURE — 96375 TX/PRO/DX INJ NEW DRUG ADDON: CPT

## 2020-03-10 RX ORDER — PROPOFOL 10 MG/ML
INJECTION, EMULSION INTRAVENOUS AS NEEDED
Status: DISCONTINUED | OUTPATIENT
Start: 2020-03-10 | End: 2020-03-10 | Stop reason: SURG

## 2020-03-10 RX ORDER — PANTOPRAZOLE SODIUM 40 MG/1
40 TABLET, DELAYED RELEASE ORAL 2 TIMES DAILY
Qty: 60 TABLET | Refills: 3 | Status: SHIPPED | OUTPATIENT
Start: 2020-03-10 | End: 2020-03-11

## 2020-03-10 RX ORDER — HYDROMORPHONE HCL/PF 1 MG/ML
0.5 SYRINGE (ML) INJECTION
Status: DISCONTINUED | OUTPATIENT
Start: 2020-03-10 | End: 2020-03-10 | Stop reason: HOSPADM

## 2020-03-10 RX ORDER — PROMETHAZINE HYDROCHLORIDE 25 MG/ML
12.5 INJECTION, SOLUTION INTRAMUSCULAR; INTRAVENOUS ONCE AS NEEDED
Status: DISCONTINUED | OUTPATIENT
Start: 2020-03-10 | End: 2020-03-10 | Stop reason: HOSPADM

## 2020-03-10 RX ORDER — SODIUM CHLORIDE, SODIUM LACTATE, POTASSIUM CHLORIDE, CALCIUM CHLORIDE 600; 310; 30; 20 MG/100ML; MG/100ML; MG/100ML; MG/100ML
125 INJECTION, SOLUTION INTRAVENOUS CONTINUOUS
Status: CANCELLED | OUTPATIENT
Start: 2020-03-10

## 2020-03-10 RX ORDER — DEXAMETHASONE SODIUM PHOSPHATE 10 MG/ML
INJECTION, SOLUTION INTRAMUSCULAR; INTRAVENOUS AS NEEDED
Status: DISCONTINUED | OUTPATIENT
Start: 2020-03-10 | End: 2020-03-10 | Stop reason: SURG

## 2020-03-10 RX ORDER — MIDAZOLAM HYDROCHLORIDE 2 MG/2ML
INJECTION, SOLUTION INTRAMUSCULAR; INTRAVENOUS AS NEEDED
Status: DISCONTINUED | OUTPATIENT
Start: 2020-03-10 | End: 2020-03-10 | Stop reason: SURG

## 2020-03-10 RX ORDER — MEPERIDINE HYDROCHLORIDE 25 MG/ML
12.5 INJECTION INTRAMUSCULAR; INTRAVENOUS; SUBCUTANEOUS AS NEEDED
Status: DISCONTINUED | OUTPATIENT
Start: 2020-03-10 | End: 2020-03-10 | Stop reason: HOSPADM

## 2020-03-10 RX ORDER — ONDANSETRON 2 MG/ML
4 INJECTION INTRAMUSCULAR; INTRAVENOUS ONCE AS NEEDED
Status: DISCONTINUED | OUTPATIENT
Start: 2020-03-10 | End: 2020-03-10 | Stop reason: HOSPADM

## 2020-03-10 RX ORDER — ALBUTEROL SULFATE 2.5 MG/3ML
2.5 SOLUTION RESPIRATORY (INHALATION) ONCE AS NEEDED
Status: DISCONTINUED | OUTPATIENT
Start: 2020-03-10 | End: 2020-03-10 | Stop reason: HOSPADM

## 2020-03-10 RX ORDER — ONDANSETRON 2 MG/ML
INJECTION INTRAMUSCULAR; INTRAVENOUS AS NEEDED
Status: DISCONTINUED | OUTPATIENT
Start: 2020-03-10 | End: 2020-03-10 | Stop reason: SURG

## 2020-03-10 RX ORDER — SODIUM CHLORIDE, SODIUM LACTATE, POTASSIUM CHLORIDE, CALCIUM CHLORIDE 600; 310; 30; 20 MG/100ML; MG/100ML; MG/100ML; MG/100ML
INJECTION, SOLUTION INTRAVENOUS CONTINUOUS PRN
Status: DISCONTINUED | OUTPATIENT
Start: 2020-03-10 | End: 2020-03-10 | Stop reason: SURG

## 2020-03-10 RX ORDER — SUCCINYLCHOLINE/SOD CL,ISO/PF 100 MG/5ML
SYRINGE (ML) INTRAVENOUS AS NEEDED
Status: DISCONTINUED | OUTPATIENT
Start: 2020-03-10 | End: 2020-03-10 | Stop reason: SURG

## 2020-03-10 RX ORDER — FENTANYL CITRATE/PF 50 MCG/ML
25 SYRINGE (ML) INJECTION
Status: DISCONTINUED | OUTPATIENT
Start: 2020-03-10 | End: 2020-03-10 | Stop reason: HOSPADM

## 2020-03-10 RX ORDER — GLYCOPYRROLATE 0.2 MG/ML
INJECTION INTRAMUSCULAR; INTRAVENOUS AS NEEDED
Status: DISCONTINUED | OUTPATIENT
Start: 2020-03-10 | End: 2020-03-10 | Stop reason: SURG

## 2020-03-10 RX ORDER — LABETALOL 20 MG/4 ML (5 MG/ML) INTRAVENOUS SYRINGE
5
Status: DISCONTINUED | OUTPATIENT
Start: 2020-03-10 | End: 2020-03-10 | Stop reason: HOSPADM

## 2020-03-10 RX ORDER — LIDOCAINE HYDROCHLORIDE 10 MG/ML
INJECTION, SOLUTION EPIDURAL; INFILTRATION; INTRACAUDAL; PERINEURAL AS NEEDED
Status: DISCONTINUED | OUTPATIENT
Start: 2020-03-10 | End: 2020-03-10 | Stop reason: SURG

## 2020-03-10 RX ADMIN — GLUCAGON HYDROCHLORIDE 1 MG: KIT at 10:49

## 2020-03-10 RX ADMIN — GLYCOPYRROLATE 0.2 MG: 0.2 INJECTION, SOLUTION INTRAMUSCULAR; INTRAVENOUS at 15:14

## 2020-03-10 RX ADMIN — PROPOFOL 200 MG: 10 INJECTION, EMULSION INTRAVENOUS at 15:15

## 2020-03-10 RX ADMIN — Medication 100 MG: at 15:16

## 2020-03-10 RX ADMIN — LIDOCAINE HYDROCHLORIDE 50 MG: 10 INJECTION, SOLUTION EPIDURAL; INFILTRATION; INTRACAUDAL; PERINEURAL at 15:14

## 2020-03-10 RX ADMIN — DEXAMETHASONE SODIUM PHOSPHATE 4 MG: 10 INJECTION, SOLUTION INTRAMUSCULAR; INTRAVENOUS at 15:20

## 2020-03-10 RX ADMIN — SODIUM CHLORIDE, SODIUM LACTATE, POTASSIUM CHLORIDE, AND CALCIUM CHLORIDE: .6; .31; .03; .02 INJECTION, SOLUTION INTRAVENOUS at 15:12

## 2020-03-10 RX ADMIN — ONDANSETRON 4 MG: 2 INJECTION INTRAMUSCULAR; INTRAVENOUS at 15:14

## 2020-03-10 RX ADMIN — MIDAZOLAM HYDROCHLORIDE 2 MG: 1 INJECTION, SOLUTION INTRAMUSCULAR; INTRAVENOUS at 15:12

## 2020-03-10 RX ADMIN — EPINEPHRINE 1 MG: 0.1 INJECTION, SOLUTION ENDOTRACHEAL; INTRACARDIAC; INTRAVENOUS at 15:27

## 2020-03-10 NOTE — ED PROVIDER NOTES
History  Chief Complaint   Patient presents with    Vomiting     from Urgent Care w/vomiting starting last night     14-year-old female presents from urgent care for evaluation vomiting  Patient states that she tolerated a normal breakfast and lunch yesterday  Last evening she cooked a small piece of fried chicken and took 2 bites  She felt as if the food did not reach her stomach and it came back up undigested  Patient has tried to take sips of fluids with emesis following shortly  She was not able to keep water down this morning  Patient states that she feels that the food and fluid gets hung up in the distal esophagus region  Patient reports a history of intermittent regurgitation if she eats too quickly  Patient denies chest pain  No shortness of breath  History provided by:  Patient and medical records   used: No    Vomiting   Severity:  Severe  Duration:  12 hours  Timing:  Constant  Quality:  Undigested food  Progression:  Unchanged  Chronicity:  New  Recent urination:  Normal  Relieved by:  Nothing  Worsened by:  Nothing  Ineffective treatments:  Liquids  Associated symptoms: no abdominal pain and no fever    Risk factors: no diabetes and no sick contacts        Prior to Admission Medications   Prescriptions Last Dose Informant Patient Reported?  Taking?   atorvastatin (LIPITOR) 10 mg tablet 3/9/2020 at Unknown time  No Yes   Sig: Take 1 tablet (10 mg total) by mouth daily   hydrochlorothiazide (HYDRODIURIL) 25 mg tablet More than a month at Unknown time  No No   Sig: Take 1 tablet (25 mg total) by mouth daily   nadolol (CORGARD) 20 mg tablet 3/10/2020 at Unknown time  No Yes   Sig: Take 1 tablet (20 mg total) by mouth daily   spironolactone (ALDACTONE) 100 mg tablet 3/10/2020 at Unknown time  No Yes   Sig: Take 1 tablet (100 mg total) by mouth 2 (two) times a day      Facility-Administered Medications: None       Past Medical History:   Diagnosis Date    Anxiety     Arrhythmia     CKD (chronic kidney disease)     Hyperlipidemia     Hypertension     Obesity        Past Surgical History:   Procedure Laterality Date    BREAST EXCISIONAL BIOPSY Left 1970       Family History   Problem Relation Age of Onset   Anjana Jett Breast cancer Mother 43    Diabetes Father     Kidney disease Father     Breast cancer Maternal Aunt 80     I have reviewed and agree with the history as documented  E-Cigarette/Vaping    E-Cigarette Use Never User      E-Cigarette/Vaping Substances     Social History     Tobacco Use    Smoking status: Never Smoker    Smokeless tobacco: Never Used   Substance Use Topics    Alcohol use: Never     Frequency: Never     Comment: pt reports maybe 3 drinks a year    Drug use: Never       Review of Systems   Constitutional: Negative for fever  HENT: Positive for trouble swallowing  Gastrointestinal: Positive for vomiting  Negative for abdominal pain  All other systems reviewed and are negative  Physical Exam  Physical Exam   Constitutional: She is oriented to person, place, and time  She appears well-developed and well-nourished  No distress  HENT:   Head: Normocephalic  Nose: Nose normal    Mouth/Throat: Oropharynx is clear and moist  No oropharyngeal exudate  Eyes: Pupils are equal, round, and reactive to light  Conjunctivae and EOM are normal    Neck: Normal range of motion  Neck supple  Cardiovascular: Normal rate, regular rhythm, normal heart sounds and intact distal pulses  Pulmonary/Chest: Effort normal and breath sounds normal    Abdominal: Soft  Bowel sounds are normal  She exhibits no distension  There is tenderness in the epigastric area  There is no rebound and no guarding  Musculoskeletal: Normal range of motion  She exhibits no edema, tenderness or deformity  Lymphadenopathy:     She has no cervical adenopathy  Neurological: She is alert and oriented to person, place, and time  She has normal strength and normal reflexes  No cranial nerve deficit or sensory deficit  She exhibits normal muscle tone  Coordination and gait normal    Skin: Skin is warm, dry and intact  No rash noted  Psychiatric: She has a normal mood and affect  Her behavior is normal  Judgment and thought content normal    Nursing note and vitals reviewed  Vital Signs  ED Triage Vitals [03/10/20 0941]   Temperature Pulse Respirations Blood Pressure SpO2   97 8 °F (36 6 °C) 80 18 (!) 178/89 95 %      Temp Source Heart Rate Source Patient Position - Orthostatic VS BP Location FiO2 (%)   Oral Monitor Lying Right arm --      Pain Score       3           Vitals:    03/10/20 1538 03/10/20 1545 03/10/20 1555 03/10/20 1619   BP: 140/82 132/78 120/55 132/72   Pulse: 84 86 85 75   Patient Position - Orthostatic VS:             Visual Acuity      ED Medications  Medications   glucagon (GLUCAGEN) injection 1 mg (1 mg Intravenous Given 3/10/20 1049)   EPINEPHrine (ADRENALIN) injection (1 mg Intravenous Given 3/10/20 1527)       Diagnostic Studies  Results Reviewed     None                 No orders to display              Procedures  Procedures         ED Course  ED Course as of Mar 13 2135   Rey Velazquez Mar 10, 2020   1001 Patient re-evaluated by me  She is tolerating sips of warm cola  She feels that this is staying down  She not yet regurgitated  Will continue to monitor an advance to sips of water as tolerated  1032 Patient did bring up the warm cola that she initially thought was staying down                                    MDM  Number of Diagnoses or Management Options  Esophageal obstruction due to food impaction: new and requires workup     Amount and/or Complexity of Data Reviewed  Clinical lab tests: reviewed and ordered  Decide to obtain previous medical records or to obtain history from someone other than the patient: yes  Discuss the patient with other providers: yes  Independent visualization of images, tracings, or specimens: yes    Risk of Complications, Morbidity, and/or Mortality  General comments: Pt  To OR with Gi for endoscopy    Patient Progress  Patient progress: stable        Disposition  Final diagnoses:   Esophageal obstruction due to food impaction     Time reflects when diagnosis was documented in both MDM as applicable and the Disposition within this note     Time User Action Codes Description Comment    3/10/2020 11:46 AM Lynette Grijalva Add [T58 525Y] Food impaction of esophagus, initial encounter     3/10/2020 11:46 AM Jerkatie Valentine Add [R13 10] Esophageal dysphagia     3/10/2020 11:46 AM Lynette Valentine Modify [E25 571H] Food impaction of esophagus, initial encounter     3/10/2020 11:47 AM Natalie Vidal Add [K22 2,  B16 586O] Esophageal obstruction due to food impaction       ED Disposition     ED Disposition Condition Date/Time Comment    Send to OR  Tue Mar 10, 2020 11:47 AM       Follow-up Information    None         Discharge Medication List as of 3/10/2020  4:15 PM      START taking these medications    Details   pantoprazole (PROTONIX) 40 mg tablet Take 1 tablet (40 mg total) by mouth 2 (two) times a day, Starting Tue 3/10/2020, Normal         CONTINUE these medications which have NOT CHANGED    Details   atorvastatin (LIPITOR) 10 mg tablet Take 1 tablet (10 mg total) by mouth daily, Starting Tue 1/14/2020, Normal      hydrochlorothiazide (HYDRODIURIL) 25 mg tablet Take 1 tablet (25 mg total) by mouth daily, Starting Tue 1/14/2020, Normal      nadolol (CORGARD) 20 mg tablet Take 1 tablet (20 mg total) by mouth daily, Starting Tue 1/14/2020, Normal      spironolactone (ALDACTONE) 100 mg tablet Take 1 tablet (100 mg total) by mouth 2 (two) times a day, Starting Tue 1/14/2020, Normal           Outpatient Discharge Orders   EGD   Standing Status: Future Number of Occurrences: 1 Standing Exp   Date: 03/10/21       PDMP Review     None          ED Provider  Electronically Signed by           Terell Anton DO  03/13/20 6176

## 2020-03-10 NOTE — PATIENT INSTRUCTIONS
Vomiting and abdominal pain since eating fried food last evening (03/09/2020); patient states she is unable to keep anything down; will send patient to the [facility] emergency department now by private vehicle for further evaluation and care

## 2020-03-10 NOTE — ANESTHESIA PREPROCEDURE EVALUATION
Review of Systems/Medical History  Patient summary reviewed        Cardiovascular  Negative cardio ROS Exercise tolerance (METS): >4,  Hyperlipidemia, Hypertension ,    Pulmonary  Negative pulmonary ROS        GI/Hepatic  Negative GI/hepatic ROS          Negative  ROS        Endo/Other  Negative endo/other ROS      GYN  Negative gynecology ROS          Hematology  Negative hematology ROS      Musculoskeletal  Negative musculoskeletal ROS        Neurology  Negative neurology ROS      Psychology   Negative psychology ROS Anxiety,              Physical Exam    Airway    Mallampati score: II  TM Distance: >3 FB  Neck ROM: full     Dental   No notable dental hx     Cardiovascular  Comment: Negative ROS,     Pulmonary      Other Findings        Anesthesia Plan  ASA Score- 2 Emergent    Anesthesia Type- general with ASA Monitors  Additional Monitors:   Airway Plan: ETT  Comment: Patient seen and examined  History reviewed  Patient to be done under general anesthesia with ETT and routine monitors  Risks discussed with the patient  Consent obtained        Plan Factors-    Induction- intravenous  Postoperative Plan- Plan for postoperative opioid use  Planned trial extubation    Informed Consent- Anesthetic plan and risks discussed with patient  I personally reviewed this patient with the CRNA  Discussed and agreed on the Anesthesia Plan with the CRNA  Merlene Lomax

## 2020-03-10 NOTE — ANESTHESIA POSTPROCEDURE EVALUATION
Post-Op Assessment Note    CV Status:  Stable  Pain Score: 0    Pain management: adequate     Mental Status:  Alert and awake   Hydration Status:  Euvolemic   PONV Controlled:  Controlled   Airway Patency:  Patent   Post Op Vitals Reviewed: Yes      Staff: CRNA, Anesthesiologist           BP (P) 140/82 (03/10/20 1538)    Temp (P) 97 9 °F (36 6 °C) (03/10/20 1538)    Pulse (P) 84 (03/10/20 1538)   Resp (P) 17 (03/10/20 1538)    SpO2 (P) 97 % (03/10/20 1538)

## 2020-03-10 NOTE — PROGRESS NOTES
[address] [facility]        NAME: Kb Rubi is a 71 y o  female  : 1955    MRN: [id number]  DATE: March 10, 2020  TIME: 8:44 AM    Assessment and Plan   Intractable vomiting with nausea, unspecified vomiting type [R11 2]  1  Intractable vomiting with nausea, unspecified vomiting type  Transfer to other facility   2  Epigastric pain  Transfer to other facility         Patient Instructions     Patient Instructions   Vomiting and abdominal pain since eating fried food last evening (2020); patient states she is unable to keep anything down; will send patient to the [facility] emergency department now by private vehicle for further evaluation and care  Chief Complaint     Chief Complaint   Patient presents with    Vomiting     x yesterday after supper, (fried meal), with nausea, denies diarrhea  c/o"cant keep anything down, not even water"    Abdominal Pain     middle upper abd  crampy /spasms          History of Present Illness       Patient with vomiting and abdominal pain since eating fried food last evening (2020); patient states she is unable to keep anything down; patient denies any prior abdominal problems      Review of Systems   Review of Systems   HENT: Negative  Respiratory: Negative  Cardiovascular: Negative  Gastrointestinal: Positive for abdominal pain, nausea and vomiting  Negative for diarrhea  Musculoskeletal: Negative  Skin: Negative  Neurological: Negative            Current Medications       Current Outpatient Medications:     atorvastatin (LIPITOR) 10 mg tablet, Take 1 tablet (10 mg total) by mouth daily, Disp: 90 tablet, Rfl: 3    hydrochlorothiazide (HYDRODIURIL) 25 mg tablet, Take 1 tablet (25 mg total) by mouth daily, Disp: 30 tablet, Rfl: 6    nadolol (CORGARD) 20 mg tablet, Take 1 tablet (20 mg total) by mouth daily, Disp: 90 tablet, Rfl: 3    spironolactone (ALDACTONE) 100 mg tablet, Take 1 tablet (100 mg total) by mouth 2 (two) times a day, Disp: 180 tablet, Rfl: 3    Current Allergies     Allergies as of 03/10/2020 - Reviewed 03/10/2020   Allergen Reaction Noted    Amoxicillin-pot clavulanate  05/11/2015    Cefprozil Diarrhea, Nausea Only, and Vomiting 05/08/2012    Sulfa antibiotics Rash and Hives 05/08/2012            The following portions of the patient's history were reviewed and updated as appropriate: allergies, current medications, past family history, past medical history, past social history, past surgical history and problem list      Past Medical History:   Diagnosis Date    Anxiety     Arrhythmia     CKD (chronic kidney disease)     Hyperlipidemia     Hypertension     Obesity        Past Surgical History:   Procedure Laterality Date    BREAST EXCISIONAL BIOPSY Left 1970       Family History   Problem Relation Age of Onset    Breast cancer Mother 43    Diabetes Father     Kidney disease Father     Breast cancer Maternal Aunt 80         Medications have been verified  Objective   /82   Pulse 92   Temp 98 °F (36 7 °C)   Resp 18   SpO2 97%        Physical Exam     Physical Exam   Constitutional: She is oriented to person, place, and time  She appears well-developed and well-nourished  HENT:   Mouth/Throat: Oropharynx is clear and moist    Cardiovascular: Normal rate, regular rhythm and normal heart sounds  Pulmonary/Chest: Effort normal and breath sounds normal    Abdominal: Soft  Normal appearance and bowel sounds are normal  There is tenderness in the epigastric area  There is no rigidity and no guarding  Neurological: She is alert and oriented to person, place, and time  No nuchal rigidity   Skin:   Fair color and turgor   Psychiatric: She has a normal mood and affect  Her behavior is normal    Vitals reviewed

## 2020-03-10 NOTE — CONSULTS
Consultation -  [facility]  Kasey Almanza 71 y o  female MRN: [id number]  Unit/Bed#: ED 29 Encounter: [id number]         Reason for Consult / Principal Problem:  Dysphagia, possible food bolus    HPI:  Shelby Julien is a 65-year-old female with history of osteoarthritis, PCOS, hypertension and hyperlipidemia  Patient presented to the emergency room early this morning from urgent care  Patient reports that she has not been able to tolerate even liquid after she had fried chicken for dinner last night  She reports that prior to this, she had a sausage and egg sandwich  Patient reports that she does have a history of dysphagia, but has never had this evaluated  She denies odynophagia, pyrosis or regurgitation typically  In the emergency room, she was given water and Pepsi  She regurgitated this up completely  She was then given glucagon, and reach trialed liquids again  She continued to regurgitate this  She is sitting comfortably in bed currently  However, she does feel a tightness substernally  Her labs are currently unremarkable in terms of her CBC, CMP, and lipid panel  Patient has never had an EGD or colonoscopy  Review of Systems:    CONSTITUTIONAL: Denies any fever, chills, or rigors  Good appetite, and no recent weight loss  HEENT: No earache or tinnitus  Denies hearing loss or visual disturbances  CARDIOVASCULAR: No chest pain or palpitations  RESPIRATORY: Denies any cough, hemoptysis, shortness of breath or dyspnea on exertion  GASTROINTESTINAL: As noted in the History of Present Illness  GENITOURINARY: No problems with urination  Denies any hematuria or dysuria  NEUROLOGIC: No dizziness or vertigo, denies headaches  MUSCULOSKELETAL: Denies any muscle or joint pain  SKIN: Denies skin rashes or itching  ENDOCRINE: Denies excessive thirst  Denies intolerance to heat or cold  PSYCHOSOCIAL: Denies depression or anxiety  Denies any recent memory loss  Historical Information   Past Medical History:   Diagnosis Date    Anxiety     Arrhythmia     CKD (chronic kidney disease)     Hyperlipidemia     Hypertension     Obesity      Past Surgical History:   Procedure Laterality Date    BREAST EXCISIONAL BIOPSY Left 1970     Social History   Social History     Substance and Sexual Activity   Alcohol Use Not on file     Social History     Substance and Sexual Activity   Drug Use Not on file     Social History     Tobacco Use   Smoking Status Never Smoker   Smokeless Tobacco Never Used     Family History   Problem Relation Age of Onset    Breast cancer Mother 43    Diabetes Father     Kidney disease Father     Breast cancer Maternal Aunt 80        Meds/Allergies     No current facility-administered medications for this encounter  Allergies   Allergen Reactions    Amoxicillin-Pot Clavulanate     Cefprozil Diarrhea, Nausea Only and Vomiting     Category: Allergy;     Sulfa Antibiotics Rash and Hives     Category: Allergy; Category: Allergy;          Objective     Blood pressure 153/91, pulse 74, temperature 97 8 °F (36 6 °C), temperature source Oral, resp  rate 18, weight 113 kg (248 lb 7 3 oz), SpO2 96 %  No intake or output data in the 24 hours ending 03/10/20 1216      PHYSICAL EXAM:      General Appearance:   Alert and oriented x 3  Cooperative, and in no respiratory distress   HEENT:   Normocephalic, atraumatic, anicteric      Neck:  Supple, symmetrical, trachea midline   Lungs:   Clear to auscultation bilaterally; no rales, rhonchi or wheezing; respirations unlabored    Heart[de-identified]   S1 and S2 normal; regular rate and rhythm; no murmur, rub, or gallop     Abdomen:   Soft, non-tender, non-distended; normal bowel sounds; no masses, no organomegaly    Genitalia:   Deferred    Rectal:   Deferred    Extremities:  No cyanosis, clubbing or edema    Pulses:  2+ and symmetric all extremities    Skin:  Skin color, texture, turgor normal, no rashes or lesions    Lymph nodes:  No palpable cervical or supraclavicular lymphadenopathy        Lab Results:             Invalid input(s): LABALBU            Imaging Studies: I have personally reviewed pertinent imaging studies  No results found  ASSESSMENT and PLAN:      1) History of intermittent dysphagia, possible food impaction - Patient vomiting clear liquids at the bedside during glucagon trial   She has a history of dysphagia intermittently for several months now  She does not have any overt reflux symptoms    - EGD today in the OR to investigate for severe reflux esophagitis, eosinophilic esophagitis, stricture, web, ring or malignancy  - Further recommendations based on above    2) Need for screening colonoscopy - Patient is age 71  She has never had a colonoscopy  She will follow up with us in the office to discuss this  The patient was seen and examined by Dr Mina Erickson, all key medical decisions were made with Dr Mina Erickson  Thank you for allowing us to participate in the care of this pleasant patient  We will follow up with you closely

## 2020-03-11 RX ORDER — PANTOPRAZOLE SODIUM 40 MG/1
TABLET, DELAYED RELEASE ORAL
Qty: 180 TABLET | Refills: 2 | Status: SHIPPED | OUTPATIENT
Start: 2020-03-11 | End: 2020-04-29 | Stop reason: SDUPTHER

## 2020-03-13 ENCOUNTER — TELEPHONE (OUTPATIENT)
Dept: GASTROENTEROLOGY | Facility: CLINIC | Age: 65
End: 2020-03-13

## 2020-03-13 NOTE — TELEPHONE ENCOUNTER
----- Message from Ewelina Madrigal MD sent at 3/10/2020  3:31 PM EDT -----  Patient seen today for food impaction, please schedule repeat upper endoscopy in 6 to 8 weeks    At [facility]

## 2020-03-27 ENCOUNTER — TELEPHONE (OUTPATIENT)
Dept: GASTROENTEROLOGY | Facility: AMBULARY SURGERY CENTER | Age: 65
End: 2020-03-27

## 2020-03-27 NOTE — TELEPHONE ENCOUNTER
Pt is scheduled for repeat egd w/ dr Claudene Junes at [facility] on 5/27/2020/instructions mailed

## 2020-03-27 NOTE — TELEPHONE ENCOUNTER
Pt is scheduled w/ dr Britney Marcus at [id number] on 5/27/2020 at [facility]/instructions mailed

## 2020-04-23 ENCOUNTER — TELEPHONE (OUTPATIENT)
Dept: GASTROENTEROLOGY | Facility: AMBULARY SURGERY CENTER | Age: 65
End: 2020-04-23

## 2020-04-29 ENCOUNTER — TELEMEDICINE (OUTPATIENT)
Dept: GASTROENTEROLOGY | Facility: CLINIC | Age: 65
End: 2020-04-29
Payer: MEDICARE

## 2020-04-29 DIAGNOSIS — Z12.11 COLON CANCER SCREENING: ICD-10-CM

## 2020-04-29 DIAGNOSIS — T18.128A FOOD IMPACTION OF ESOPHAGUS, INITIAL ENCOUNTER: ICD-10-CM

## 2020-04-29 DIAGNOSIS — T18.128D FOOD IMPACTION OF ESOPHAGUS, SUBSEQUENT ENCOUNTER: Primary | ICD-10-CM

## 2020-04-29 PROCEDURE — 99214 OFFICE O/P EST MOD 30 MIN: CPT | Performed by: INTERNAL MEDICINE

## 2020-04-29 RX ORDER — PANTOPRAZOLE SODIUM 40 MG/1
40 TABLET, DELAYED RELEASE ORAL DAILY
Qty: 90 TABLET | Refills: 3 | Status: SHIPPED | OUTPATIENT
Start: 2020-04-29 | End: 2020-12-28 | Stop reason: SDUPTHER

## 2020-05-13 ENCOUNTER — ANESTHESIA EVENT (OUTPATIENT)
Dept: GASTROENTEROLOGY | Facility: AMBULARY SURGERY CENTER | Age: 65
End: 2020-05-13

## 2020-05-20 DIAGNOSIS — Z11.59 SPECIAL SCREENING EXAMINATION FOR VIRAL DISEASE: ICD-10-CM

## 2020-05-20 PROCEDURE — U0003 INFECTIOUS AGENT DETECTION BY NUCLEIC ACID (DNA OR RNA); SEVERE ACUTE RESPIRATORY SYNDROME CORONAVIRUS 2 (SARS-COV-2) (CORONAVIRUS DISEASE [COVID-19]), AMPLIFIED PROBE TECHNIQUE, MAKING USE OF HIGH THROUGHPUT TECHNOLOGIES AS DESCRIBED BY CMS-2020-01-R: HCPCS

## 2020-05-21 LAB — SARS-COV-2 RNA SPEC QL NAA+PROBE: NOT DETECTED

## 2020-05-27 ENCOUNTER — HOSPITAL ENCOUNTER (OUTPATIENT)
Dept: GASTROENTEROLOGY | Facility: AMBULARY SURGERY CENTER | Age: 65
Setting detail: OUTPATIENT SURGERY
Discharge: HOME | End: 2020-05-27
Attending: INTERNAL MEDICINE | Admitting: INTERNAL MEDICINE
Payer: MEDICARE

## 2020-05-27 ENCOUNTER — ANESTHESIA (OUTPATIENT)
Dept: GASTROENTEROLOGY | Facility: AMBULARY SURGERY CENTER | Age: 65
End: 2020-05-27

## 2020-05-27 VITALS
OXYGEN SATURATION: 98 % | HEART RATE: 74 BPM | HEIGHT: 62 IN | TEMPERATURE: 98 F | SYSTOLIC BLOOD PRESSURE: 134 MMHG | RESPIRATION RATE: 16 BRPM | BODY MASS INDEX: 45.64 KG/M2 | DIASTOLIC BLOOD PRESSURE: 62 MMHG | WEIGHT: 248 LBS

## 2020-05-27 DIAGNOSIS — R13.19 ESOPHAGEAL DYSPHAGIA: ICD-10-CM

## 2020-05-27 PROCEDURE — 43239 EGD BIOPSY SINGLE/MULTIPLE: CPT | Performed by: INTERNAL MEDICINE

## 2020-05-27 PROCEDURE — 43249 ESOPH EGD DILATION <30 MM: CPT | Performed by: INTERNAL MEDICINE

## 2020-05-27 PROCEDURE — 88305 TISSUE EXAM BY PATHOLOGIST: CPT | Performed by: PATHOLOGY

## 2020-05-27 PROCEDURE — C1726 CATH, BAL DIL, NON-VASCULAR: HCPCS

## 2020-05-27 RX ORDER — SODIUM CHLORIDE, SODIUM LACTATE, POTASSIUM CHLORIDE, CALCIUM CHLORIDE 600; 310; 30; 20 MG/100ML; MG/100ML; MG/100ML; MG/100ML
125 INJECTION, SOLUTION INTRAVENOUS CONTINUOUS
Status: DISCONTINUED | OUTPATIENT
Start: 2020-05-27 | End: 2020-05-31 | Stop reason: HOSPADM

## 2020-05-27 RX ORDER — PROPOFOL 10 MG/ML
INJECTION, EMULSION INTRAVENOUS AS NEEDED
Status: DISCONTINUED | OUTPATIENT
Start: 2020-05-27 | End: 2020-05-27 | Stop reason: SURG

## 2020-05-27 RX ORDER — LIDOCAINE HYDROCHLORIDE 10 MG/ML
INJECTION, SOLUTION EPIDURAL; INFILTRATION; INTRACAUDAL; PERINEURAL AS NEEDED
Status: DISCONTINUED | OUTPATIENT
Start: 2020-05-27 | End: 2020-05-27 | Stop reason: SURG

## 2020-05-27 RX ADMIN — SODIUM CHLORIDE, SODIUM LACTATE, POTASSIUM CHLORIDE, AND CALCIUM CHLORIDE: .6; .31; .03; .02 INJECTION, SOLUTION INTRAVENOUS at 12:11

## 2020-05-27 RX ADMIN — PROPOFOL 50 MG: 10 INJECTION, EMULSION INTRAVENOUS at 13:01

## 2020-05-27 RX ADMIN — LIDOCAINE HYDROCHLORIDE 50 MG: 10 INJECTION, SOLUTION EPIDURAL; INFILTRATION; INTRACAUDAL; PERINEURAL at 12:53

## 2020-05-27 RX ADMIN — PROPOFOL 100 MG: 10 INJECTION, EMULSION INTRAVENOUS at 12:53

## 2020-05-27 RX ADMIN — PROPOFOL 50 MG: 10 INJECTION, EMULSION INTRAVENOUS at 12:56

## 2020-05-27 RX ADMIN — PROPOFOL 50 MG: 10 INJECTION, EMULSION INTRAVENOUS at 12:59

## 2020-06-22 ENCOUNTER — HOSPITAL ENCOUNTER (OUTPATIENT)
Dept: RADIOLOGY | Age: 65
Discharge: HOME | End: 2020-06-22
Payer: MEDICARE

## 2020-06-22 VITALS — BODY MASS INDEX: 46.01 KG/M2 | WEIGHT: 250 LBS | HEIGHT: 62 IN

## 2020-06-22 DIAGNOSIS — Z12.31 ENCOUNTER FOR SCREENING MAMMOGRAM FOR MALIGNANT NEOPLASM OF BREAST: ICD-10-CM

## 2020-06-22 PROCEDURE — 77063 BREAST TOMOSYNTHESIS BI: CPT

## 2020-06-22 PROCEDURE — 77067 SCR MAMMO BI INCL CAD: CPT

## 2020-07-14 ENCOUNTER — OFFICE VISIT (OUTPATIENT)
Dept: CARDIOLOGY CLINIC | Facility: CLINIC | Age: 65
End: 2020-07-14
Payer: MEDICARE

## 2020-07-14 VITALS
DIASTOLIC BLOOD PRESSURE: 84 MMHG | HEART RATE: 71 BPM | SYSTOLIC BLOOD PRESSURE: 130 MMHG | WEIGHT: 254 LBS | BODY MASS INDEX: 46.74 KG/M2 | HEIGHT: 62 IN

## 2020-07-14 DIAGNOSIS — E78.2 MIXED HYPERLIPIDEMIA: ICD-10-CM

## 2020-07-14 DIAGNOSIS — R00.2 PALPITATION: ICD-10-CM

## 2020-07-14 DIAGNOSIS — I10 ESSENTIAL HYPERTENSION: Primary | ICD-10-CM

## 2020-07-14 PROCEDURE — 99213 OFFICE O/P EST LOW 20 MIN: CPT | Performed by: INTERNAL MEDICINE

## 2020-07-14 NOTE — LETTER
July 14, 2020     Evern Cowden, [address] [address]    Patient: Errol Lin   YOB: 1955   Date of Visit: 7/14/2020       Dear Dr Alyssa Hurd: Thank you for referring Nimisha Omalley to me for evaluation  Below are my notes for this consultation  If you have questions, please do not hesitate to call me  I look forward to following your patient along with you  Sincerely,        Jay Patterson MD        CC: No Recipients  Jay Patterson MD  7/14/2020 11:25 AM  Sign at close encounter  Assessment/Plan:    Essential hypertension    Hypertension, stable adequately controlled  Palpitation    History of palpitation, stable  The patient is asymptomatic at this time  Hyperlipidemia    Hyperlipidemia, stable  The patient will continue atorvastatin at 10 mg daily  Diagnoses and all orders for this visit:    Essential hypertension    Palpitation    Mixed hyperlipidemia          Subjective:   Feels well  Patient ID: Errol Lin is a 71 y o  female  Patient presented to this office for the purpose of cardiac follow-up  She has a known history of hypertension hyperlipidemia well as symptoms of palpitation  Approximately  4 months ago the patient esophageal irritation with food impaction requiring endoscopy  She has since been on pantoprazole with significant improvement her symptoms  At this point she is feeling well from the cardiac standpoint denying any symptoms of chest pain, shortness of breath palpitation, dizziness or lightheadedness  She has no significant leg edema  The following portions of the patient's history were reviewed and updated as appropriate: allergies, current medications, past family history, past medical history, past social history, past surgical history and problem list     Review of Systems   Respiratory: Negative for apnea, cough, chest tightness, shortness of breath and wheezing      Cardiovascular: Negative for chest pain, palpitations and leg swelling  Gastrointestinal: Negative for abdominal pain  Neurological: Negative for dizziness and light-headedness  Hematological: Negative  Psychiatric/Behavioral: Negative  Objective:  Stable cardiac-wise  /84 (BP Location: Left arm, Patient Position: Sitting, Cuff Size: Large)   Pulse 71   Ht 5' 2" (1 575 m)   Wt 115 kg (254 lb)   BMI 46 46 kg/m²           Physical Exam   Constitutional: She is oriented to person, place, and time  She appears well-developed and well-nourished  No distress  HENT:   Head: Normocephalic and atraumatic  Neck: Normal range of motion  Neck supple  No JVD present  No thyromegaly present  Cardiovascular: Normal rate, regular rhythm, S1 normal and S2 normal  Exam reveals no gallop and no friction rub  No murmur heard  Pulmonary/Chest: Effort normal  No respiratory distress  She has no wheezes  She has no rales  She exhibits no tenderness  Abdominal: Soft  Musculoskeletal: She exhibits no edema  Neurological: She is alert and oriented to person, place, and time  Skin: Skin is warm and dry  She is not diaphoretic  Psychiatric: She has a normal mood and affect  Vitals reviewed

## 2020-07-14 NOTE — PROGRESS NOTES
Assessment/Plan:    Essential hypertension    Hypertension, stable adequately controlled  Palpitation    History of palpitation, stable  The patient is asymptomatic at this time  Hyperlipidemia    Hyperlipidemia, stable  The patient will continue atorvastatin at 10 mg daily  Diagnoses and all orders for this visit:    Essential hypertension    Palpitation    Mixed hyperlipidemia          Subjective:   Feels well  Patient ID: Asmita Ramos is a 71 y o  female  Patient presented to this office for the purpose of cardiac follow-up  She has a known history of hypertension hyperlipidemia well as symptoms of palpitation  Approximately  4 months ago the patient esophageal irritation with food impaction requiring endoscopy  She has since been on pantoprazole with significant improvement her symptoms  At this point she is feeling well from the cardiac standpoint denying any symptoms of chest pain, shortness of breath palpitation, dizziness or lightheadedness  She has no significant leg edema  The following portions of the patient's history were reviewed and updated as appropriate: allergies, current medications, past family history, past medical history, past social history, past surgical history and problem list     Review of Systems   Respiratory: Negative for apnea, cough, chest tightness, shortness of breath and wheezing  Cardiovascular: Negative for chest pain, palpitations and leg swelling  Gastrointestinal: Negative for abdominal pain  Neurological: Negative for dizziness and light-headedness  Hematological: Negative  Psychiatric/Behavioral: Negative  Objective:  Stable cardiac-wise  /84 (BP Location: Left arm, Patient Position: Sitting, Cuff Size: Large)   Pulse 71   Ht 5' 2" (1 575 m)   Wt 115 kg (254 lb)   BMI 46 46 kg/m²          Physical Exam   Constitutional: She is oriented to person, place, and time   She appears well-developed and well-nourished  No distress  HENT:   Head: Normocephalic and atraumatic  Neck: Normal range of motion  Neck supple  No JVD present  No thyromegaly present  Cardiovascular: Normal rate, regular rhythm, S1 normal and S2 normal  Exam reveals no gallop and no friction rub  No murmur heard  Pulmonary/Chest: Effort normal  No respiratory distress  She has no wheezes  She has no rales  She exhibits no tenderness  Abdominal: Soft  Musculoskeletal: She exhibits no edema  Neurological: She is alert and oriented to person, place, and time  Skin: Skin is warm and dry  She is not diaphoretic  Psychiatric: She has a normal mood and affect  Vitals reviewed

## 2020-12-28 ENCOUNTER — OFFICE VISIT (OUTPATIENT)
Dept: CARDIOLOGY CLINIC | Facility: CLINIC | Age: 65
End: 2020-12-28
Payer: MEDICARE

## 2020-12-28 VITALS
SYSTOLIC BLOOD PRESSURE: 134 MMHG | WEIGHT: 260 LBS | DIASTOLIC BLOOD PRESSURE: 76 MMHG | BODY MASS INDEX: 47.84 KG/M2 | HEIGHT: 62 IN | OXYGEN SATURATION: 100 % | HEART RATE: 61 BPM

## 2020-12-28 DIAGNOSIS — E78.2 MIXED HYPERLIPIDEMIA: ICD-10-CM

## 2020-12-28 DIAGNOSIS — T18.128A FOOD IMPACTION OF ESOPHAGUS, INITIAL ENCOUNTER: ICD-10-CM

## 2020-12-28 DIAGNOSIS — I10 ESSENTIAL HYPERTENSION: Primary | ICD-10-CM

## 2020-12-28 DIAGNOSIS — R00.2 PALPITATION: ICD-10-CM

## 2020-12-28 PROCEDURE — 99214 OFFICE O/P EST MOD 30 MIN: CPT | Performed by: INTERNAL MEDICINE

## 2020-12-28 RX ORDER — PANTOPRAZOLE SODIUM 40 MG/1
40 TABLET, DELAYED RELEASE ORAL DAILY
Qty: 90 TABLET | Refills: 3 | Status: SHIPPED | OUTPATIENT
Start: 2020-12-28 | End: 2021-12-18

## 2020-12-28 RX ORDER — SPIRONOLACTONE 100 MG/1
100 TABLET, FILM COATED ORAL 2 TIMES DAILY
Qty: 180 TABLET | Refills: 3 | Status: SHIPPED | OUTPATIENT
Start: 2020-12-28 | End: 2021-12-18 | Stop reason: SDUPTHER

## 2020-12-28 RX ORDER — HYDROCHLOROTHIAZIDE 25 MG/1
25 TABLET ORAL DAILY
Qty: 30 TABLET | Refills: 6 | Status: SHIPPED | OUTPATIENT
Start: 2020-12-28 | End: 2020-12-30 | Stop reason: SDUPTHER

## 2020-12-28 RX ORDER — ATORVASTATIN CALCIUM 10 MG/1
10 TABLET, FILM COATED ORAL DAILY
Qty: 90 TABLET | Refills: 3 | Status: SHIPPED | OUTPATIENT
Start: 2020-12-28 | End: 2021-12-18

## 2020-12-28 RX ORDER — NADOLOL 20 MG/1
20 TABLET ORAL DAILY
Qty: 90 TABLET | Refills: 3 | Status: SHIPPED | OUTPATIENT
Start: 2020-12-28 | End: 2021-12-18 | Stop reason: SDUPTHER

## 2020-12-30 DIAGNOSIS — I10 ESSENTIAL HYPERTENSION: ICD-10-CM

## 2020-12-30 DIAGNOSIS — E78.2 MIXED HYPERLIPIDEMIA: ICD-10-CM

## 2020-12-30 RX ORDER — HYDROCHLOROTHIAZIDE 25 MG/1
25 TABLET ORAL DAILY
Qty: 90 TABLET | Refills: 3 | Status: SHIPPED | OUTPATIENT
Start: 2020-12-30 | End: 2021-12-18 | Stop reason: SDUPTHER

## 2021-02-08 ENCOUNTER — APPOINTMENT (OUTPATIENT)
Dept: LAB | Age: 66
End: 2021-02-08
Payer: MEDICARE

## 2021-02-08 ENCOUNTER — LAB (OUTPATIENT)
Dept: LAB | Age: 66
End: 2021-02-08
Payer: MEDICARE

## 2021-02-08 ENCOUNTER — TRANSCRIBE ORDERS (OUTPATIENT)
Dept: ADMINISTRATIVE | Age: 66
End: 2021-02-08

## 2021-02-08 DIAGNOSIS — E78.2 MIXED HYPERLIPIDEMIA: ICD-10-CM

## 2021-02-08 DIAGNOSIS — E28.2 POLYCYSTIC OVARIES: ICD-10-CM

## 2021-02-08 DIAGNOSIS — R00.2 PALPITATION: ICD-10-CM

## 2021-02-08 DIAGNOSIS — E28.2 POLYCYSTIC OVARIES: Primary | ICD-10-CM

## 2021-02-08 DIAGNOSIS — I10 ESSENTIAL HYPERTENSION: ICD-10-CM

## 2021-02-08 LAB
ALBUMIN SERPL BCP-MCNC: 3.4 G/DL (ref 3.5–5)
ALP SERPL-CCNC: 83 U/L (ref 46–116)
ALT SERPL W P-5'-P-CCNC: 15 U/L (ref 12–78)
ANION GAP SERPL CALCULATED.3IONS-SCNC: 8 MMOL/L (ref 4–13)
AST SERPL W P-5'-P-CCNC: 17 U/L (ref 5–45)
BASOPHILS # BLD AUTO: 0.09 THOUSANDS/ΜL (ref 0–0.1)
BASOPHILS NFR BLD AUTO: 1 % (ref 0–1)
BILIRUB SERPL-MCNC: 0.53 MG/DL (ref 0.2–1)
BUN SERPL-MCNC: 16 MG/DL (ref 5–25)
CALCIUM ALBUM COR SERPL-MCNC: 10 MG/DL (ref 8.3–10.1)
CALCIUM SERPL-MCNC: 9.5 MG/DL (ref 8.3–10.1)
CHLORIDE SERPL-SCNC: 108 MMOL/L (ref 100–108)
CHOLEST SERPL-MCNC: 142 MG/DL (ref 50–200)
CO2 SERPL-SCNC: 25 MMOL/L (ref 21–32)
CREAT SERPL-MCNC: 0.89 MG/DL (ref 0.6–1.3)
EOSINOPHIL # BLD AUTO: 0.07 THOUSAND/ΜL (ref 0–0.61)
EOSINOPHIL NFR BLD AUTO: 1 % (ref 0–6)
ERYTHROCYTE [DISTWIDTH] IN BLOOD BY AUTOMATED COUNT: 14.1 % (ref 11.6–15.1)
EST. AVERAGE GLUCOSE BLD GHB EST-MCNC: 111 MG/DL
GFR SERPL CREATININE-BSD FRML MDRD: 68 ML/MIN/1.73SQ M
GLUCOSE P FAST SERPL-MCNC: 100 MG/DL (ref 65–99)
HBA1C MFR BLD: 5.5 %
HCT VFR BLD AUTO: 48.8 % (ref 34.8–46.1)
HDLC SERPL-MCNC: 47 MG/DL
HGB BLD-MCNC: 15.5 G/DL (ref 11.5–15.4)
IMM GRANULOCYTES # BLD AUTO: 0 THOUSAND/UL (ref 0–0.2)
IMM GRANULOCYTES NFR BLD AUTO: 0 % (ref 0–2)
LDLC SERPL CALC-MCNC: 73 MG/DL (ref 0–100)
LYMPHOCYTES # BLD AUTO: 2.49 THOUSANDS/ΜL (ref 0.6–4.47)
LYMPHOCYTES NFR BLD AUTO: 39 % (ref 14–44)
MCH RBC QN AUTO: 28.8 PG (ref 26.8–34.3)
MCHC RBC AUTO-ENTMCNC: 31.8 G/DL (ref 31.4–37.4)
MCV RBC AUTO: 91 FL (ref 82–98)
MONOCYTES # BLD AUTO: 0.64 THOUSAND/ΜL (ref 0.17–1.22)
MONOCYTES NFR BLD AUTO: 10 % (ref 4–12)
NEUTROPHILS # BLD AUTO: 3.07 THOUSANDS/ΜL (ref 1.85–7.62)
NEUTS SEG NFR BLD AUTO: 49 % (ref 43–75)
NONHDLC SERPL-MCNC: 95 MG/DL
NRBC BLD AUTO-RTO: 0 /100 WBCS
PLATELET # BLD AUTO: 236 THOUSANDS/UL (ref 149–390)
PMV BLD AUTO: 10.7 FL (ref 8.9–12.7)
POTASSIUM SERPL-SCNC: 4.3 MMOL/L (ref 3.5–5.3)
PROT SERPL-MCNC: 7.1 G/DL (ref 6.4–8.2)
RBC # BLD AUTO: 5.38 MILLION/UL (ref 3.81–5.12)
SODIUM SERPL-SCNC: 141 MMOL/L (ref 136–145)
TRIGL SERPL-MCNC: 112 MG/DL
TSH SERPL DL<=0.05 MIU/L-ACNC: 2.89 UIU/ML (ref 0.36–3.74)
WBC # BLD AUTO: 6.36 THOUSAND/UL (ref 4.31–10.16)

## 2021-02-08 PROCEDURE — 84443 ASSAY THYROID STIM HORMONE: CPT | Performed by: INTERNAL MEDICINE

## 2021-02-08 PROCEDURE — 83036 HEMOGLOBIN GLYCOSYLATED A1C: CPT

## 2021-02-08 PROCEDURE — 36415 COLL VENOUS BLD VENIPUNCTURE: CPT

## 2021-02-08 PROCEDURE — 80053 COMPREHEN METABOLIC PANEL: CPT

## 2021-02-08 PROCEDURE — 80061 LIPID PANEL: CPT

## 2021-02-08 PROCEDURE — 85025 COMPLETE CBC W/AUTO DIFF WBC: CPT

## 2021-03-10 DIAGNOSIS — Z23 ENCOUNTER FOR IMMUNIZATION: ICD-10-CM

## 2021-05-10 ENCOUNTER — EVALUATION (OUTPATIENT)
Dept: PHYSICAL THERAPY | Facility: CLINIC | Age: 66
End: 2021-05-10
Payer: MEDICARE

## 2021-05-10 DIAGNOSIS — G89.29 CHRONIC PAIN OF BOTH KNEES: Primary | ICD-10-CM

## 2021-05-10 DIAGNOSIS — M25.562 CHRONIC PAIN OF BOTH KNEES: Primary | ICD-10-CM

## 2021-05-10 DIAGNOSIS — M25.561 CHRONIC PAIN OF BOTH KNEES: Primary | ICD-10-CM

## 2021-05-10 PROCEDURE — 97162 PT EVAL MOD COMPLEX 30 MIN: CPT | Performed by: PHYSICAL THERAPIST

## 2021-05-10 PROCEDURE — 97110 THERAPEUTIC EXERCISES: CPT | Performed by: PHYSICAL THERAPIST

## 2021-05-10 NOTE — LETTER
May 10, 2021    Chirag Washburn, [address] [id number]    Patient: Justino Taveras   YOB: 1955   Date of Visit: 5/10/2021     Encounter Diagnosis     ICD-10-CM    1  Chronic pain of both knees  M25 561     M25 562     G89 29        Dear Dr Cailin Sheehan: Thank you for your recent referral of Lenetta Yocha Dehe  Please review the attached evaluation summary from Abi's recent visit  Please verify that you agree with the plan of care by signing the attached order  If you have any questions or concerns, please do not hesitate to call  I sincerely appreciate the opportunity to share in the care of one of your patients and hope to have another opportunity to work with you in the near future  Sincerely,    Kevon Santiago, PT      Referring Provider:      I certify that I have read the below Plan of Care and certify the need for these services furnished under this plan of treatment while under my care  Chirag Washburn PA-C  [address] [place] [address]  Via Fax: [phone]          PT Evaluation     Today's date: 5/10/2021  Patient name: Justino Taveras  : 1955  MRN: [id number]  Referring provider: Amaury Monroe PA-C  Dx:   Encounter Diagnosis     ICD-10-CM    1  Chronic pain of both knees  M25 561     M25 562     G89 29                   Assessment/Plan    Subjective Evaluation    History of Present Illness  Mechanism of injury: dislocation  Mechanism of injury: Patient reports a 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  She reports that her R patella dislocates periodically  She has had hyaline cartilage shots in B knees  She did see an orthopedist several years ago who made the diagnosis  She has not been back to ortho in several years  CC:  She reports L medial knee pain  She notes no pain on the R except with when her R patella  Function:  She is currently retired    She notes that she has difficulty with walking long distances  Denies pain waking her at night  Stairs are difficult descending worse than ascending  She states that the pain does not limit her ability to perform activities  Recurrent probem    Quality of life: excellent    Pain  Current pain ratin  At best pain ratin  At worst pain ratin  Quality: sharp  Relieving factors: change in position  Aggravating factors: stair climbing and walking  Progression: worsening    Social Support  Steps to enter house: yes  Stairs in house: yes   Lives in: multiple-level home  Lives with: spouse    Employment status: not working    Diagnostic Tests  X-ray: abnormal  Treatments  Previous treatment: physical therapy and injection treatment  Patient Goals  Patient goals for therapy: increased strength, independence with ADLs/IADLs and decreased pain          Objective     Static Posture     Knee   Genu varus  Knee (Left): Recurvatum  Knee (Right): Recurvatum  Palpation     Additional Palpation Details  Lateral retinaculum    Tenderness     Right Knee   Tenderness in the ITB, lateral patella and lateral retinaculum  Active Range of Motion   Left Knee   Hyperextension  Flexion: 122 degrees   Extension: 0 degrees   Extensor la degrees     Right Knee   Hyperextension   Flexion: 110 degrees   Extension: 0 degrees   Extensor la degrees     Mobility   Patellar Mobility:   Left Knee   Hypomobile: left medial, left lateral, left superior and left inferior    Right Knee   Hypomobile: medial, lateral, superior and inferior     Strength/Myotome Testing     Left Knee   Flexion: 4  Extension: 4  Quadriceps contraction: poor    Right Knee   Normal strength  Quadriceps contraction: fair    Additional Strength Details  Hip strength:  Hip flexion abduction     Tests     Left Knee   Negative valgus stress test at 0 degrees, valgus stress test at 30 degrees, varus stress test at 0 degrees and varus stress test at 30 degrees       Right Knee   Positive patellar apprehension and valgus stress test at 30 degrees  Negative valgus stress test at 0 degrees, varus stress test at 0 degrees and varus stress test at 30 degrees  Ambulation     Observational Gait   Gait: antalgic   Decreased walking speed and stride length       Functional Assessment        Single Leg Stance - Eyes Open   Left  Trial 1: 5 seconds  Trial 2: 5 seconds  Trial 3: 5 seconds  Average: 5 seconds     Right  Trial 1: 4 seconds  Trial 2: 6 seconds  Trial 3: 5 seconds  Average: 5 seconds              Precautions: ***      Manuals                                                                 Neuro Re-Ed                                                                                                        Ther Ex                                                                                                                     Ther Activity                                       Gait Training                                       Modalities

## 2021-05-10 NOTE — PROGRESS NOTES
PT Evaluation     Today's date: 5/10/2021  Patient name: General Kennedy  : 1955  MRN: [id number]  Referring provider: Tea Richmond PA-C  Dx:   Encounter Diagnosis     ICD-10-CM    1  Chronic pain of both knees  M25 561     M25 562     G89 29                   Assessment  Assessment details: General Kennedy is a 76 y o  female with chronic pain of both knees related to OA  Patient also with chronic dislocation of the R patella  She presents with pain, decreased strength, decreased ROM, and  ambulatory dysfunction  Due to these impairments, patient has difficulty performing a/iadls, recreational activities and engaging in social activities  Patient's clinical presentation is consistent with their referring diagnosis  Patient would benefit from skilled physical therapy to address their aforementioned impairments, improve their level of function and to improve their overall quality of life   has been given a home exercise program and is in agreement with the plan of care  Thank you for your referral   Impairments: abnormal gait, abnormal muscle firing, abnormal or restricted ROM, activity intolerance, impaired balance, impaired physical strength, lacks appropriate home exercise program, pain with function and weight-bearing intolerance    Symptom irritability: moderateBarriers to therapy: Patient's weight  Duration of sx  Understanding of Dx/Px/POC: excellent  Goals  ST Goals - 2-4 weeks  1  Patient will report decreased pain with activity by at least 2 points within 4 weeks  2  Patient will improve ROM 5-10 degrees within 4 weeks  3  Patient will demonstrate ability to actively correct posture without cueing within 4 weeks  4  Patient will perform IADLs without pain in 2 weeks  5  Patient will increase strength by 25% in 4 weeks    LT Goals - Discharge  1  Patient will improve FOTO score to maximum stated or greater by discharge  2   Patient will return to preferred recreational activity without significant pain increase by discharge   3  Patient will return to all house work related activities without pain by discharge     Plan  Patient would benefit from: skilled physical therapy  Planned therapy interventions: joint mobilization, manual therapy, neuromuscular re-education, strengthening, stretching, therapeutic activities, therapeutic exercise, home exercise program and balance  Frequency: 2x week  Duration in visits: 20  Duration in weeks: 20  Plan of Care beginning date: 5/10/2021  Plan of Care expiration date: 8/10/2021  Treatment plan discussed with: patient        Subjective Evaluation    History of Present Illness  Mechanism of injury: dislocation  Mechanism of injury: Patient reports a 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  She reports that her R patella dislocates periodically  She has had hyaline cartilage shots in B knees  She did see an orthopedist several years ago who made the diagnosis  She has not been back to ortho in several years  CC:  She reports L medial knee pain  She notes no pain on the R except with when her R patella  Function:  She is currently retired  She notes that she has difficulty with walking long distances  Denies pain waking her at night  Stairs are difficult descending worse than ascending  She states that the pain does not limit her ability to perform activities              Recurrent probem    Quality of life: excellent    Pain  Current pain ratin  At best pain ratin  At worst pain ratin  Quality: sharp  Relieving factors: change in position  Aggravating factors: stair climbing and walking  Progression: worsening    Social Support  Steps to enter house: yes  Stairs in house: yes   Lives in: multiple-level home  Lives with: spouse    Employment status: not working    Diagnostic Tests  X-ray: abnormal  Treatments  Previous treatment: physical therapy and injection treatment  Patient Goals  Patient goals for therapy: increased strength, independence with ADLs/IADLs and decreased pain          Objective     Static Posture     Knee   Genu varus  Knee (Left): Recurvatum  Knee (Right): Recurvatum  Palpation     Additional Palpation Details  Lateral retinaculum    Tenderness     Right Knee   Tenderness in the ITB, lateral patella and lateral retinaculum  Active Range of Motion   Left Knee   Hyperextension  Flexion: 122 degrees   Extension: 0 degrees   Extensor la degrees     Right Knee   Hyperextension   Flexion: 110 degrees   Extension: 0 degrees   Extensor la degrees     Mobility   Patellar Mobility:   Left Knee   Hypomobile: left medial, left lateral, left superior and left inferior    Right Knee   Hypomobile: medial, lateral, superior and inferior     Strength/Myotome Testing     Left Knee   Flexion: 4  Extension: 4  Quadriceps contraction: poor    Right Knee   Normal strength  Quadriceps contraction: fair    Additional Strength Details  Hip strength:  Hip flexion abduction     Tests     Left Knee   Negative valgus stress test at 0 degrees, valgus stress test at 30 degrees, varus stress test at 0 degrees and varus stress test at 30 degrees  Right Knee   Positive patellar apprehension and valgus stress test at 30 degrees  Negative valgus stress test at 0 degrees, varus stress test at 0 degrees and varus stress test at 30 degrees  Ambulation     Observational Gait   Gait: antalgic   Decreased walking speed and stride length       Functional Assessment        Single Leg Stance - Eyes Open   Left  Trial 1: 5 seconds  Trial 2: 5 seconds  Trial 3: 5 seconds  Average: 5 seconds     Right  Trial 1: 4 seconds  Trial 2: 6 seconds  Trial 3: 5 seconds  Average: 5 seconds              Precautions: HTN;   R patella dislocates       Manuals 5/10            R patella DARWIN                                                    Neuro Re-Ed                                                                                                        Ther Ex Bike             TM                          SLR 2x5            Heel Slides - L 10 x :10            QS B 10 x :05            Hip ABD iso nv            Hip ADDiso nv            Ther Activity                                       Gait Training                                       Modalities

## 2021-05-10 NOTE — LETTER
May 10, 2021    Moo Corona, [address] [address]    Patient: Noemy Umanzor   YOB: 1955   Date of Visit: 5/10/2021     Encounter Diagnosis     ICD-10-CM    1  Chronic pain of both knees  M25 561     M25 562     G89 29        Dear Dr Pedro Aponte: Thank you for your recent referral of Noemy Umanzor  Please review the attached evaluation summary from Abi's recent visit  Please verify that you agree with the plan of care by signing the attached order  If you have any questions or concerns, please do not hesitate to call  I sincerely appreciate the opportunity to share in the care of one of your patients and hope to have another opportunity to work with you in the near future  Sincerely,    Jayson Andre, PT      Referring Provider:      I certify that I have read the below Plan of Care and certify the need for these services furnished under this plan of treatment while under my care  Moo Corona PA-C  [address] [place] [address]  Via Fax: [phone]          PT Evaluation     Today's date: 5/10/2021  Patient name: Noemy Umanzor  : 1955  MRN: [id number]  Referring provider: Gisselle Gutierrez PA-C  Dx:   Encounter Diagnosis     ICD-10-CM    1  Chronic pain of both knees  M25 561     M25 562     G89 29                   Assessment/Plan    Subjective Evaluation    History of Present Illness  Mechanism of injury: dislocation  Mechanism of injury: Patient reports a 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  She reports that her R patella dislocates periodically  She has had hyaline cartilage shots in B knees  She did see an orthopedist several years ago who made the diagnosis  She has not been back to ortho in several years  CC:  She reports L medial knee pain  She notes no pain on the R except with when her R patella  Function:  She is currently retired    She notes that she has difficulty with walking long distances  Denies pain waking her at night  Stairs are difficult descending worse than ascending  She states that the pain does not limit her ability to perform activities  Recurrent probem    Quality of life: excellent    Pain  Current pain ratin  At best pain ratin  At worst pain ratin  Quality: sharp  Relieving factors: change in position  Aggravating factors: stair climbing and walking  Progression: worsening    Social Support  Steps to enter house: yes  Stairs in house: yes   Lives in: multiple-level home  Lives with: spouse    Employment status: not working    Diagnostic Tests  X-ray: abnormal  Treatments  Previous treatment: physical therapy and injection treatment  Patient Goals  Patient goals for therapy: increased strength, independence with ADLs/IADLs and decreased pain          Objective     Static Posture     Knee   Genu varus  Knee (Left): Recurvatum  Knee (Right): Recurvatum  Palpation     Additional Palpation Details  Lateral retinaculum    Tenderness     Right Knee   Tenderness in the ITB, lateral patella and lateral retinaculum  Active Range of Motion   Left Knee   Hyperextension  Flexion: 122 degrees   Extension: 0 degrees   Extensor la degrees     Right Knee   Hyperextension   Flexion: 110 degrees   Extension: 0 degrees   Extensor la degrees     Mobility   Patellar Mobility:   Left Knee   Hypomobile: left medial, left lateral, left superior and left inferior    Right Knee   Hypomobile: medial, lateral, superior and inferior     Strength/Myotome Testing     Left Knee   Flexion: 4  Extension: 4  Quadriceps contraction: poor    Right Knee   Normal strength  Quadriceps contraction: fair    Additional Strength Details  Hip strength:  Hip flexion abduction     Tests     Left Knee   Negative valgus stress test at 0 degrees, valgus stress test at 30 degrees, varus stress test at 0 degrees and varus stress test at 30 degrees       Right Knee   Positive patellar apprehension and valgus stress test at 30 degrees  Negative valgus stress test at 0 degrees, varus stress test at 0 degrees and varus stress test at 30 degrees  Ambulation     Observational Gait   Gait: antalgic   Decreased walking speed and stride length       Functional Assessment        Single Leg Stance - Eyes Open   Left  Trial 1: 5 seconds  Trial 2: 5 seconds  Trial 3: 5 seconds  Average: 5 seconds     Right  Trial 1: 4 seconds  Trial 2: 6 seconds  Trial 3: 5 seconds  Average: 5 seconds              Precautions: ***      Manuals                                                                 Neuro Re-Ed                                                                                                        Ther Ex                                                                                                                     Ther Activity                                       Gait Training                                       Modalities

## 2021-05-10 NOTE — LETTER
May 10, 2021    Elsa Borja, [address] [id number]    Patient: Telly Duggan   YOB: 1955   Date of Visit: 5/10/2021     Encounter Diagnosis     ICD-10-CM    1  Chronic pain of both knees  M25 561     M25 562     G89 29        Dear Dr Alexandra Graves: Thank you for your recent referral of Telly Duggan  Please review the attached evaluation summary from Abi's recent visit  Please verify that you agree with the plan of care by signing the attached order  If you have any questions or concerns, please do not hesitate to call  I sincerely appreciate the opportunity to share in the care of one of your patients and hope to have another opportunity to work with you in the near future  Sincerely,    Alberto Miller, PT      Referring Provider:      I certify that I have read the below Plan of Care and certify the need for these services furnished under this plan of treatment while under my care  Elsa Borja PA-C  [address] [place] [address]  Via Fax: [phone]          PT Evaluation     Today's date: 5/10/2021  Patient name: Telly Duggan  : 1955  MRN: [id number]  Referring provider: Melba Lynn PA-C  Dx:   Encounter Diagnosis     ICD-10-CM    1  Chronic pain of both knees  M25 561     M25 562     G89 29                   Assessment  Assessment details: Telly Duggan is a 76 y o  female with chronic pain of both knees related to OA  Patient also with chronic dislocation of the R patella  She presents with pain, decreased strength, decreased ROM, and  ambulatory dysfunction  Due to these impairments, patient has difficulty performing a/iadls, recreational activities and engaging in social activities  Patient's clinical presentation is consistent with their referring diagnosis   Patient would benefit from skilled physical therapy to address their aforementioned impairments, improve their level of function and to improve their overall quality of life   has been given a home exercise program and is in agreement with the plan of care  Thank you for your referral   Impairments: abnormal gait, abnormal muscle firing, abnormal or restricted ROM, activity intolerance, impaired balance, impaired physical strength, lacks appropriate home exercise program, pain with function and weight-bearing intolerance    Symptom irritability: moderateBarriers to therapy: Patient's weight  Duration of sx  Understanding of Dx/Px/POC: excellent  Goals  ST Goals - 2-4 weeks  1  Patient will report decreased pain with activity by at least 2 points within 4 weeks  2  Patient will improve ROM 5-10 degrees within 4 weeks  3  Patient will demonstrate ability to actively correct posture without cueing within 4 weeks  4  Patient will perform IADLs without pain in 2 weeks  5  Patient will increase strength by 25% in 4 weeks    LT Goals - Discharge  1  Patient will improve FOTO score to maximum stated or greater by discharge  2  Patient will return to preferred recreational activity without significant pain increase by discharge   3  Patient will return to all house work related activities without pain by discharge     Plan  Patient would benefit from: skilled physical therapy  Planned therapy interventions: joint mobilization, manual therapy, neuromuscular re-education, strengthening, stretching, therapeutic activities, therapeutic exercise, home exercise program and balance  Frequency: 2x week  Duration in visits: 20  Duration in weeks: 20  Plan of Care beginning date: 5/10/2021  Plan of Care expiration date: 8/10/2021  Treatment plan discussed with: patient        Subjective Evaluation    History of Present Illness  Mechanism of injury: dislocation  Mechanism of injury: Patient reports a 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  She reports that her R patella dislocates periodically  She has had hyaline cartilage shots in B knees    She did see an orthopedist several years ago who made the diagnosis  She has not been back to ortho in several years  CC:  She reports L medial knee pain  She notes no pain on the R except with when her R patella  Function:  She is currently retired  She notes that she has difficulty with walking long distances  Denies pain waking her at night  Stairs are difficult descending worse than ascending  She states that the pain does not limit her ability to perform activities  Recurrent probem    Quality of life: excellent    Pain  Current pain ratin  At best pain ratin  At worst pain ratin  Quality: sharp  Relieving factors: change in position  Aggravating factors: stair climbing and walking  Progression: worsening    Social Support  Steps to enter house: yes  Stairs in house: yes   Lives in: multiple-level home  Lives with: spouse    Employment status: not working    Diagnostic Tests  X-ray: abnormal  Treatments  Previous treatment: physical therapy and injection treatment  Patient Goals  Patient goals for therapy: increased strength, independence with ADLs/IADLs and decreased pain          Objective     Static Posture     Knee   Genu varus  Knee (Left): Recurvatum  Knee (Right): Recurvatum  Palpation     Additional Palpation Details  Lateral retinaculum    Tenderness     Right Knee   Tenderness in the ITB, lateral patella and lateral retinaculum       Active Range of Motion   Left Knee   Hyperextension  Flexion: 122 degrees   Extension: 0 degrees   Extensor la degrees     Right Knee   Hyperextension   Flexion: 110 degrees   Extension: 0 degrees   Extensor la degrees     Mobility   Patellar Mobility:   Left Knee   Hypomobile: left medial, left lateral, left superior and left inferior    Right Knee   Hypomobile: medial, lateral, superior and inferior     Strength/Myotome Testing     Left Knee   Flexion: 4  Extension: 4  Quadriceps contraction: poor    Right Knee   Normal strength  Quadriceps contraction: fair    Additional Strength Details  Hip strength:  Hip flexion abduction     Tests     Left Knee   Negative valgus stress test at 0 degrees, valgus stress test at 30 degrees, varus stress test at 0 degrees and varus stress test at 30 degrees  Right Knee   Positive patellar apprehension and valgus stress test at 30 degrees  Negative valgus stress test at 0 degrees, varus stress test at 0 degrees and varus stress test at 30 degrees  Ambulation     Observational Gait   Gait: antalgic   Decreased walking speed and stride length       Functional Assessment        Single Leg Stance - Eyes Open   Left  Trial 1: 5 seconds  Trial 2: 5 seconds  Trial 3: 5 seconds  Average: 5 seconds     Right  Trial 1: 4 seconds  Trial 2: 6 seconds  Trial 3: 5 seconds  Average: 5 seconds              Precautions: HTN;   R patella dislocates       Manuals 5/10            R patella JM                                                    Neuro Re-Ed                                                                                                        Ther Ex             Bike             TM                          SLR 2x5            Heel Slides - L 10 x :10            QS B 10 x :05            Hip ABD iso nv            Hip ADDiso nv            Ther Activity                                       Gait Training                                       Modalities

## 2021-05-17 ENCOUNTER — OFFICE VISIT (OUTPATIENT)
Dept: PHYSICAL THERAPY | Facility: CLINIC | Age: 66
End: 2021-05-17
Payer: MEDICARE

## 2021-05-17 DIAGNOSIS — M25.561 CHRONIC PAIN OF BOTH KNEES: Primary | ICD-10-CM

## 2021-05-17 DIAGNOSIS — M25.562 CHRONIC PAIN OF BOTH KNEES: Primary | ICD-10-CM

## 2021-05-17 DIAGNOSIS — G89.29 CHRONIC PAIN OF BOTH KNEES: Primary | ICD-10-CM

## 2021-05-17 PROCEDURE — 97110 THERAPEUTIC EXERCISES: CPT

## 2021-05-17 PROCEDURE — 97112 NEUROMUSCULAR REEDUCATION: CPT

## 2021-05-17 NOTE — PROGRESS NOTES
Daily Note     Today's date: 2021  Patient name: Nakul Bonds  : 1955  MRN: [id number]  Referring provider: Wendy Gamez PA-C  Dx: No diagnosis found  Subjective: Patient states that she has been compliant with HEP   Some soreness in R quad  Objective: See treatment diary below      Assessment: Tolerated treatment well  Patient exhibited good technique with therapeutic exercises      Plan: Continue per plan of care        Precautions: HTN;   R patella dislocates       Manuals 5/10 5/17           R patella JM                                                    Neuro Re-Ed                                                                                                        Ther Ex             Bike to begin  5 min            TM to end   5 min                        SLR 2x5 2x5           Heel Slides - L 10 x :10            QS B 10 x :05 10 x :05            Hip ABD iso nv 5"x10           Hip ADDiso nv 5"x10           Clamshells  NV            SAQ  5" x 10            LAQ  5"  x 10            Sit to stand   X 10            Ther Activity                                       Gait Training                                       Modalities

## 2021-05-19 ENCOUNTER — OFFICE VISIT (OUTPATIENT)
Dept: PHYSICAL THERAPY | Facility: CLINIC | Age: 66
End: 2021-05-19
Payer: MEDICARE

## 2021-05-19 DIAGNOSIS — M25.562 CHRONIC PAIN OF BOTH KNEES: Primary | ICD-10-CM

## 2021-05-19 DIAGNOSIS — M25.561 CHRONIC PAIN OF BOTH KNEES: Primary | ICD-10-CM

## 2021-05-19 DIAGNOSIS — G89.29 CHRONIC PAIN OF BOTH KNEES: Primary | ICD-10-CM

## 2021-05-19 PROCEDURE — 97112 NEUROMUSCULAR REEDUCATION: CPT

## 2021-05-19 PROCEDURE — 97110 THERAPEUTIC EXERCISES: CPT

## 2021-05-19 NOTE — PROGRESS NOTES
Daily Note     Today's date: 2021  Patient name: Edenilson Glaser  : 1955  MRN: [id number]  Referring provider: Elia Carmichael PA-C  Dx:   Encounter Diagnosis     ICD-10-CM    1  Chronic pain of both knees  M25 561     M25 562     G89 29                   Subjective: Moderate DOMS following last treatment session but no complaints of pain in knees       Objective: See treatment diary below      Assessment: Patient unable to complete stationary bike due to R knee subluxing with flexion  No complaints with CKC exercises  Noticeable strength deficit between R >L LE     Plan: Continue per plan of care        Precautions: HTN;   R patella dislocates       Manuals 5/10 5/17 5/19          R patella JM                                                    Neuro Re-Ed                                                                                                        Ther Ex             Bike to begin  5 min            TM to end   5 min 5 min                        SLR 2x5 2x5 2x5          Heel Slides - L 10 x :10            QS B 10 x :05 10 x :05  :05 x 10           Hip ABD iso nv 5"x10 5" x 10           Hip ADDiso nv 5"x10 5" x 10           Clamshells  NV  RTB 2 x 10          SAQ  5" x 10  1 5# 5"x 10          LAQ  5"  x 10  5"x 10           Sit to stand   X 10  X 10           Standing hip 3 way    B x 10 each          Ther Activity                                       Gait Training                                       Modalities

## 2021-05-24 ENCOUNTER — OFFICE VISIT (OUTPATIENT)
Dept: PHYSICAL THERAPY | Facility: CLINIC | Age: 66
End: 2021-05-24
Payer: MEDICARE

## 2021-05-24 DIAGNOSIS — M25.561 CHRONIC PAIN OF BOTH KNEES: Primary | ICD-10-CM

## 2021-05-24 DIAGNOSIS — M25.562 CHRONIC PAIN OF BOTH KNEES: Primary | ICD-10-CM

## 2021-05-24 DIAGNOSIS — G89.29 CHRONIC PAIN OF BOTH KNEES: Primary | ICD-10-CM

## 2021-05-24 PROCEDURE — 97110 THERAPEUTIC EXERCISES: CPT

## 2021-05-24 PROCEDURE — 97112 NEUROMUSCULAR REEDUCATION: CPT

## 2021-05-24 NOTE — PROGRESS NOTES
Daily Note     Today's date: 2021  Patient name: Noemy Umanzor  : 1955  MRN: [id number]  Referring provider: Gisselle Gutierrez PA-C  Dx:   Encounter Diagnosis     ICD-10-CM    1  Chronic pain of both knees  M25 561     M25 562     G89 29                   Subjective: Patient state that she has noticed an improvement with pain levels  Objective: See treatment diary below      Assessment: Tolerated treatment well  Patient exhibited good technique with therapeutic exercises      Plan: Continue per plan of care  Precautions: HTN;   R patella dislocates       Manuals 5/10 5/17 5/19 5/24         R patella JM                                                    Neuro Re-Ed                                                                                                        Ther Ex             Bike to begin  5 min  p!           TM   5 min 5 min  5 min                       SLR 2x5 2x5 2x5 10x         Heel Slides - L 10 x :10            QS B 10 x :05 10 x :05  :05 x 10  5" x 10          Hip ABD iso nv 5"x10 5" x 10  5" x20          Hip ADDiso nv 5"x10 5" x 10  5" x20          Clamshells  NV  RTB 2 x 10 BTB 5" x 20          SAQ  5" x 10  1 5# 5"x 10 R 2# 2 x 10          LAQ  5"  x 10  5"x 10  B 2# 5" x 10          Sit to stand   X 10  X 10  X 10          Standing hip 3 way    B x 10 each B x 10 ea         Side stepping    RTB 5 laps         Ther Activity                                       Gait Training                                       Modalities

## 2021-05-26 ENCOUNTER — OFFICE VISIT (OUTPATIENT)
Dept: PHYSICAL THERAPY | Facility: CLINIC | Age: 66
End: 2021-05-26
Payer: MEDICARE

## 2021-05-26 DIAGNOSIS — M25.562 CHRONIC PAIN OF BOTH KNEES: Primary | ICD-10-CM

## 2021-05-26 DIAGNOSIS — G89.29 CHRONIC PAIN OF BOTH KNEES: Primary | ICD-10-CM

## 2021-05-26 DIAGNOSIS — M25.561 CHRONIC PAIN OF BOTH KNEES: Primary | ICD-10-CM

## 2021-05-26 PROCEDURE — 97112 NEUROMUSCULAR REEDUCATION: CPT

## 2021-05-26 PROCEDURE — 97110 THERAPEUTIC EXERCISES: CPT

## 2021-05-26 NOTE — PROGRESS NOTES
Daily Note     Today's date: 2021  Patient name: Justino Taveras  : 1955  MRN: [id number]  Referring provider: Amaury Monroe PA-C  Dx: No diagnosis found  Subjective: No complaints of pain following last treatment and patient noticing an improvement with tolerance to pain with outlined treatment  Objective: See treatment diary below      Assessment: Tolerated treatment well  Patient exhibited good technique with therapeutic exercises      Plan: Continue per plan of care  Precautions: HTN;   R patella dislocates       Manuals 5/10 5/17 5/19 5/24 5/26        R patella JM                                                    Neuro Re-Ed                                                                                                        Ther Ex             Bike to begin  5 min  p!           TM   5 min 5 min  5 min  7 min                     SLR 2x5 2x5 2x5 10x 12        Heel Slides - L 10 x :10            QS B 10 x :05 10 x :05  :05 x 10  5" x 10  5' x 20         Hip ABD iso nv 5"x10 5" x 10  5" x20  5" x 20         Hip ADDiso nv 5"x10 5" x 10  5" x20  5" x 20         Clamshells  NV  RTB 2 x 10 BTB 5" x 20  BTB 5" x 20         SAQ  5" x 10  1 5# 5"x 10 R 2# 2 x 10  5# 2 x 10         LAQ  5"  x 10  5"x 10  B 2# 5" x 10  B 2 5# 5  " x 15        Sit to stand   X 10  X 10  X 10  x10         Standing hip 3 way    B x 10 each B x 10 ea X 10        Side stepping    RTB 5 laps RTb 5 laps        Ther Activity                                       Gait Training                                       Modalities

## 2021-06-01 ENCOUNTER — OFFICE VISIT (OUTPATIENT)
Dept: PHYSICAL THERAPY | Facility: CLINIC | Age: 66
End: 2021-06-01
Payer: MEDICARE

## 2021-06-01 DIAGNOSIS — M25.562 CHRONIC PAIN OF BOTH KNEES: Primary | ICD-10-CM

## 2021-06-01 DIAGNOSIS — G89.29 CHRONIC PAIN OF BOTH KNEES: Primary | ICD-10-CM

## 2021-06-01 DIAGNOSIS — M25.561 CHRONIC PAIN OF BOTH KNEES: Primary | ICD-10-CM

## 2021-06-01 PROCEDURE — 97112 NEUROMUSCULAR REEDUCATION: CPT

## 2021-06-01 PROCEDURE — 97110 THERAPEUTIC EXERCISES: CPT

## 2021-06-01 NOTE — PROGRESS NOTES
Daily Note     Today's date: 2021  Patient name: Joe Moon  : 1955  MRN: [id number]  Referring provider: Charlie Weinstein PA-C  Dx:   Encounter Diagnosis     ICD-10-CM    1  Chronic pain of both knees  M25 561     M25 562     G89 29                   Subjective: Patient reports her knees are feeling good today  Objective: See treatment diary below      Assessment: Tolerated treatment well  Patient reports no increased pain during or post tx  Plan: Continue per plan of care  Precautions: HTN;   R patella dislocates       Manuals 5/10 5/17 5/19 5/24 5/26 6/1       R patella JM                                                    Neuro Re-Ed                                                                                                        Ther Ex             Bike to begin  5 min  p!           TM   5 min 5 min  5 min  7 min 7 min                    SLR 2x5 2x5 2x5 10x 12 12ea       Heel Slides - L 10 x :10            QS B 10 x :05 10 x :05  :05 x 10  5" x 10  5' x 20  5" x20       Hip ABD iso nv 5"x10 5" x 10  5" x20  5" x 20  5" x20       Hip ADDiso nv 5"x10 5" x 10  5" x20  5" x 20  5" x20       Clamshells  NV  RTB 2 x 10 BTB 5" x 20  BTB 5" x 20  BTB 5" x20       SAQ  5" x 10  1 5# 5"x 10 R 2# 2 x 10  5# 2 x 10  B 5" 2x10       LAQ  5"  x 10  5"x 10  B 2# 5" x 10  B 2 5# 5  " x 15 B 2 5# 5"x15       Sit to stand   X 10  X 10  X 10  x10  x10       Standing hip 3 way    B x 10 each B x 10 ea X 10 x10       Side stepping    RTB 5 laps RTb 5 laps RTB 5 laps       Ther Activity                                       Gait Training                                       Modalities

## 2021-06-03 ENCOUNTER — OFFICE VISIT (OUTPATIENT)
Dept: PHYSICAL THERAPY | Facility: CLINIC | Age: 66
End: 2021-06-03
Payer: MEDICARE

## 2021-06-03 DIAGNOSIS — G89.29 CHRONIC PAIN OF BOTH KNEES: Primary | ICD-10-CM

## 2021-06-03 DIAGNOSIS — M25.562 CHRONIC PAIN OF BOTH KNEES: Primary | ICD-10-CM

## 2021-06-03 DIAGNOSIS — M25.561 CHRONIC PAIN OF BOTH KNEES: Primary | ICD-10-CM

## 2021-06-03 PROCEDURE — 97112 NEUROMUSCULAR REEDUCATION: CPT | Performed by: PHYSICAL THERAPIST

## 2021-06-03 PROCEDURE — 97110 THERAPEUTIC EXERCISES: CPT | Performed by: PHYSICAL THERAPIST

## 2021-06-03 NOTE — PROGRESS NOTES
Daily Note     Today's date: 6/3/2021  Patient name: Gutierrez Porter  : 1955  MRN: [id number]  Referring provider: Koko Lantigua PA-C  Dx:   Encounter Diagnosis     ICD-10-CM    1  Chronic pain of both knees  M25 561     M25 562     G89 29                   Subjective: Patient reports no significant complaints in her knees  Objective: See treatment diary below      Assessment: Tolerated treatment well  Patient would benefit from continued PT  Patient with trendelenberg stance on SLS - L  Plan: Continue per plan of care  Precautions: HTN;   R patella dislocates       Manuals 5/10 5/17 5/19 5/24 5/26 6/1 6/3      R patella JM                                                    Neuro Re-Ed             SLS       5 x :10                                                                                    Ther Ex             Bike to begin  5 min  p!           TM   5 min 5 min  5 min  7 min 7 min 7'      TG       LVL 22 x 20      SLR 2x5 2x5 2x5 10x 12 12ea 1 5# x  12       Heel Slides - L 10 x :10            QS B 10 x :05 10 x :05  :05 x 10  5" x 10  5' x 20  5" x20 hep      Hip ABD iso nv 5"x10 5" x 10  5" x20  5" x 20  5" x20 20 x :05      Hip ADDiso nv 5"x10 5" x 10  5" x20  5" x 20  5" x20 20 x :05      Clamshells  NV  RTB 2 x 10 BTB 5" x 20  BTB 5" x 20  BTB 5" x20 BTB 20 x :05      SAQ  5" x 10  1 5# 5"x 10 R 2# 2 x 10  5# 2 x 10  B 5" 2x10 3 5# 2 x 10      LAQ  5"  x 10  5"x 10  B 2# 5" x 10  B 2 5# 5  " x 15 B 2 5# 5"x15 B 2 5# x 15      Sit to stand   X 10  X 10  X 10  x10  x10       Standing hip 3 way    B x 10 each B x 10 ea X 10 x10 x15 ea      Side stepping    RTB 5 laps RTb 5 laps RTB 5 laps RTB x 5 laps      Ther Activity                                       Gait Training                                       Modalities

## 2021-06-07 ENCOUNTER — OFFICE VISIT (OUTPATIENT)
Dept: PHYSICAL THERAPY | Facility: CLINIC | Age: 66
End: 2021-06-07
Payer: MEDICARE

## 2021-06-07 DIAGNOSIS — M25.562 CHRONIC PAIN OF BOTH KNEES: Primary | ICD-10-CM

## 2021-06-07 DIAGNOSIS — G89.29 CHRONIC PAIN OF BOTH KNEES: Primary | ICD-10-CM

## 2021-06-07 DIAGNOSIS — M25.561 CHRONIC PAIN OF BOTH KNEES: Primary | ICD-10-CM

## 2021-06-07 PROCEDURE — 97110 THERAPEUTIC EXERCISES: CPT

## 2021-06-07 PROCEDURE — 97112 NEUROMUSCULAR REEDUCATION: CPT

## 2021-06-10 ENCOUNTER — EVALUATION (OUTPATIENT)
Dept: PHYSICAL THERAPY | Facility: CLINIC | Age: 66
End: 2021-06-10
Payer: MEDICARE

## 2021-06-10 DIAGNOSIS — M25.561 CHRONIC PAIN OF BOTH KNEES: Primary | ICD-10-CM

## 2021-06-10 DIAGNOSIS — M25.562 CHRONIC PAIN OF BOTH KNEES: Primary | ICD-10-CM

## 2021-06-10 DIAGNOSIS — G89.29 CHRONIC PAIN OF BOTH KNEES: Primary | ICD-10-CM

## 2021-06-10 PROCEDURE — 97110 THERAPEUTIC EXERCISES: CPT | Performed by: PHYSICAL THERAPIST

## 2021-06-10 PROCEDURE — 97140 MANUAL THERAPY 1/> REGIONS: CPT | Performed by: PHYSICAL THERAPIST

## 2021-06-10 NOTE — PROGRESS NOTES
PT Re-evaluation and Discharge    Today's date: 6/10/21  Patient name: Andi Washington  : 1955  MRN: [id number]  Referring provider: Morro Zamora PA-C  Dx:   Encounter Diagnosis     ICD-10-CM    1  Chronic pain of both knees  M25 561     M25 562     G89 29                   Assessment    6/10/21:  Paloma Call has been attending physical therapy for B knee pain  She has made excellent progress in decreasing pain and increasing strength, ROM and function  She is discontinuing treatment at this time and will continue exercising on her own in the fitness center  Thank you for your referral       Assessment details: Andi Washington is a 76 y o  female with chronic pain of both knees related to OA  Patient also with chronic dislocation of the R patella  She presents with pain, decreased strength, decreased ROM, and  ambulatory dysfunction  Due to these impairments, patient has difficulty performing a/iadls, recreational activities and engaging in social activities  Patient's clinical presentation is consistent with their referring diagnosis  Patient would benefit from skilled physical therapy to address their aforementioned impairments, improve their level of function and to improve their overall quality of life   has been given a home exercise program and is in agreement with the plan of care  Thank you for your referral   Impairments: abnormal gait, abnormal muscle firing, abnormal or restricted ROM, activity intolerance, impaired balance, impaired physical strength, lacks appropriate home exercise program, pain with function and weight-bearing intolerance    Symptom irritability: moderateBarriers to therapy: Patient's weight  Duration of sx  Understanding of Dx/Px/POC: excellent  Goals  ST Goals - 2-4 weeks  1  Patient will report decreased pain with activity by at least 2 points within 4 weeks - met  2  Patient will improve ROM 5-10 degrees within 4 weeks - met  3   Patient will demonstrate ability to actively correct posture without cueing within 4 weeks - met  4  Patient will perform IADLs without pain in 2 weeks - met  5  Patient will increase strength by 25% in 4 weeks - met    LT Goals - Discharge  1  Patient will improve FOTO score to maximum stated or greater by discharge  2  Patient will return to preferred recreational activity without significant pain increase by discharge   3  Patient will return to all house work related activities without pain by discharge     Plan  Patient would benefit from: skilled physical therapy  Planned therapy interventions: joint mobilization, manual therapy, neuromuscular re-education, strengthening, stretching, therapeutic activities, therapeutic exercise, home exercise program and balance  Frequency: 2x week  Duration in visits: 20  Duration in weeks: 20  Plan of Care beginning date: 5/10/2021  Plan of Care expiration date: 8/10/2021  Treatment plan discussed with: patient        Subjective Evaluation    History of Present Illness  Mechanism of injury: dislocation  Mechanism of injury: Patient reports a 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  She reports that her R patella dislocates periodically  She has had hyaline cartilage shots in B knees  She did see an orthopedist several years ago who made the diagnosis  She has not been back to ortho in several years  CC:  She reports L medial knee pain  She notes no pain on the R except with when her R patella  Function:  She is currently retired  She notes that she has difficulty with walking long distances  Denies pain waking her at night  Stairs are difficult descending worse than ascending  She states that the pain does not limit her ability to perform activities              Recurrent probem    Quality of life: excellent    Pain  Current pain ratin  At best pain ratin  At worst pain ratin / 2/10  Quality: sharp  Relieving factors: change in position  Aggravating factors: stair climbing and walking  Progression: worsening    Social Support  Steps to enter house: yes  Stairs in house: yes   Lives in: multiple-level home  Lives with: spouse    Employment status: not working    Diagnostic Tests  X-ray: abnormal  Treatments  Previous treatment: physical therapy and injection treatment  Patient Goals  Patient goals for therapy: increased strength, independence with ADLs/IADLs and decreased pain          Objective     Static Posture     Knee   Genu varus  Knee (Left): Recurvatum  Knee (Right): Recurvatum  Palpation     Additional Palpation Details  Lateral retinaculum    Tenderness     Right Knee   Tenderness in the ITB, lateral patella and lateral retinaculum  Active Range of Motion   Left Knee   Hyperextension  Flexion: 122 degrees / 122 degrees  Extension: 0 degrees   Extensor la degrees     Right Knee   Hyperextension   Flexion: 110 degrees / 115 degrees  Extension: 0 degrees   Extensor la degrees     Mobility   Patellar Mobility:   Left Knee   Hypomobile: left medial, left lateral, left superior and left inferior    Right Knee   Hypomobile: medial, lateral, superior and inferior     Strength/Myotome Testing     Left Knee   Flexion: 4 /  5  Extension: 4 / 5  Quadriceps contraction: poor    Right Knee   Normal strength  Quadriceps contraction: fair    Additional Strength Details  Hip strength:  Hip flexion abduction     Tests     Left Knee   Negative valgus stress test at 0 degrees, valgus stress test at 30 degrees, varus stress test at 0 degrees and varus stress test at 30 degrees  Right Knee   Positive patellar apprehension and valgus stress test at 30 degrees  Negative valgus stress test at 0 degrees, varus stress test at 0 degrees and varus stress test at 30 degrees  Ambulation     Observational Gait   Gait: antalgic   Decreased walking speed and stride length       Functional Assessment        Single Leg Stance - Eyes Open   Left  Trial 1: 5 seconds  Trial 2: 5 seconds  Trial 3: 5 seconds  Average: 5 seconds  / 8 seconds    Right  Trial 1: 4 seconds  Trial 2: 6 seconds  Trial 3: 5 seconds  Average: 5 seconds / 10 seconds             Precautions: HTN;   R patella dislocates       Manuals 5/10            R patella JM                                                    Neuro Re-Ed                                                                                                        Ther Ex             Bike             TM                          SLR 2x5            Heel Slides - L 10 x :10            QS B 10 x :05            Hip ABD iso nv            Hip ADDiso nv            Ther Activity                                       Gait Training                                       Modalities

## 2021-06-10 NOTE — PROGRESS NOTES
Daily Note     Today's date: 6/10/2021  Patient name: Renan Pierce  : 1955  MRN: [id number]  Referring provider: Chai Dickson PA-C  Dx:   Encounter Diagnosis     ICD-10-CM    1  Chronic pain of both knees  M25 561     M25 562     G89 29                   Subjective: see REV      Objective: See treatment diary below      Assessment: Tolerated treatment well  Patient would benefit from continued PT      Plan: Continue per plan of care  Precautions: HTN;   R patella dislocates       Manuals 5/10 5/17 5/19 5/24 5/26 6/1 6/3 6/7 6/10    R patella JM             REV         10                              Neuro Re-Ed             SLS       5 x :10 :10x5 10 x :10                                                                                   Ther Ex             Bike to begin  5 min  p!           TM   5 min 5 min  5 min  7 min 7 min 7' 7 7'    TG       LVL 22 x 20 L22 x 10  L22 x 10    SLR 2x5 2x5 2x5 10x 12 12ea 1 5# x  12  1 5# x 14 1 5# x 15    Heel Slides - L 10 x :10            QS B 10 x :05 10 x :05  :05 x 10  5" x 10  5' x 20  5" x20 hep      Hip ABD iso nv 5"x10 5" x 10  5" x20  5" x 20  5" x20 20 x :05 20 x :05 20 x :05    Hip ADDiso nv 5"x10 5" x 10  5" x20  5" x 20  5" x20 20 x :05 20 x :05 20 x :05    Clamshells  NV  RTB 2 x 10 BTB 5" x 20  BTB 5" x 20  BTB 5" x20 BTB 20 x :05 BTB 20 x BTB x 20    SAQ  5" x 10  1 5# 5"x 10 R 2# 2 x 10  5# 2 x 10  B 5" 2x10 3 5# 2 x 10 3 5# 2 x 10  3 5# 2 x 10    LAQ  5"  x 10  5"x 10  B 2# 5" x 10  B 2 5# 5  " x 15 B 2 5# 5"x15 B 2 5# x 15 B 2 5# 2 x 10  3 5# 2 x 10    Sit to stand   X 10  X 10  X 10  x10  x10       Standing hip 3 way    B x 10 each B x 10 ea X 10 x10 x15 ea X 20  1# x 20    Side stepping    RTB 5 laps RTb 5 laps RTB 5 laps RTB x 5 laps RTB 5 laps RTB 5 laps    Ther Activity                                       Gait Training                                       Modalities

## 2021-06-14 ENCOUNTER — APPOINTMENT (OUTPATIENT)
Dept: PHYSICAL THERAPY | Facility: CLINIC | Age: 66
End: 2021-06-14
Payer: MEDICARE

## 2021-06-17 ENCOUNTER — APPOINTMENT (OUTPATIENT)
Dept: PHYSICAL THERAPY | Facility: CLINIC | Age: 66
End: 2021-06-17
Payer: MEDICARE

## 2021-06-21 ENCOUNTER — APPOINTMENT (OUTPATIENT)
Dept: PHYSICAL THERAPY | Facility: CLINIC | Age: 66
End: 2021-06-21
Payer: MEDICARE

## 2021-06-23 ENCOUNTER — APPOINTMENT (OUTPATIENT)
Dept: PHYSICAL THERAPY | Facility: CLINIC | Age: 66
End: 2021-06-23
Payer: MEDICARE

## 2021-06-24 ENCOUNTER — HOSPITAL ENCOUNTER (OUTPATIENT)
Dept: RADIOLOGY | Age: 66
Discharge: HOME | End: 2021-06-24
Payer: MEDICARE

## 2021-06-24 VITALS — WEIGHT: 260 LBS | BODY MASS INDEX: 47.84 KG/M2 | HEIGHT: 62 IN

## 2021-06-24 DIAGNOSIS — Z12.31 ENCOUNTER FOR SCREENING MAMMOGRAM FOR MALIGNANT NEOPLASM OF BREAST: ICD-10-CM

## 2021-06-24 PROCEDURE — 77067 SCR MAMMO BI INCL CAD: CPT

## 2021-06-24 PROCEDURE — 77063 BREAST TOMOSYNTHESIS BI: CPT

## 2021-06-28 ENCOUNTER — OFFICE VISIT (OUTPATIENT)
Dept: CARDIOLOGY CLINIC | Facility: CLINIC | Age: 66
End: 2021-06-28
Payer: MEDICARE

## 2021-06-28 VITALS
WEIGHT: 257 LBS | HEIGHT: 62 IN | HEART RATE: 71 BPM | DIASTOLIC BLOOD PRESSURE: 76 MMHG | SYSTOLIC BLOOD PRESSURE: 130 MMHG | OXYGEN SATURATION: 97 % | BODY MASS INDEX: 47.29 KG/M2

## 2021-06-28 DIAGNOSIS — R00.2 PALPITATION: ICD-10-CM

## 2021-06-28 DIAGNOSIS — E78.2 MIXED HYPERLIPIDEMIA: ICD-10-CM

## 2021-06-28 DIAGNOSIS — I10 ESSENTIAL HYPERTENSION: Primary | ICD-10-CM

## 2021-06-28 PROCEDURE — 99214 OFFICE O/P EST MOD 30 MIN: CPT | Performed by: INTERNAL MEDICINE

## 2021-06-28 NOTE — LETTER
June 28, 2021     Davidjonelle Castro, [address] [address]    Patient: Satish King   YOB: 1955   Date of Visit: 6/28/2021       Dear Dr Alejandra Alav: Thank you for referring Kerry Embs to me for evaluation  Below are my notes for this consultation  If you have questions, please do not hesitate to call me  I look forward to following your patient along with you  Sincerely,        Rafiq Weathers MD        CC: No Recipients  Rafiq Weathers MD  6/28/2021 11:15 AM  Sign when Signing Visit  Assessment/Plan:    Essential hypertension    Hypertension, stable and adequately controlled  We will continue present regimen  Palpitation    History cardiac arrhythmia with symptoms of palpitation stable  We will continue present regimen  Hyperlipidemia    Hyperlipidemia, stable and adequately controlled  The patient will continue atorvastatin at 10 mg daily  Diagnoses and all orders for this visit:    Essential hypertension    Palpitation    Mixed hyperlipidemia          Subjective:   Feels well  Patient ID: Satish King is a 76 y o  female  The patient presented to this office for the purpose of cardiac follow-up  She is known to have history of hypertension and hyperlipidemia as well as symptoms palpitation  The patient been feeling rather well with no chest pain or shortness of breath  No significant symptoms of palpitation noted  No leg edema  No symptoms of dizziness or lightheadedness  The following portions of the patient's history were reviewed and updated as appropriate: allergies, current medications, past family history, past medical history, past social history, past surgical history and problem list     Review of Systems   Respiratory: Negative for apnea, cough, chest tightness, shortness of breath and wheezing  Cardiovascular: Negative for chest pain, palpitations and leg swelling  Gastrointestinal: Negative for abdominal pain  Neurological: Negative for dizziness and light-headedness  Psychiatric/Behavioral: Negative  Objective:  Stable cardiac-wise  /76 (BP Location: Left arm, Patient Position: Sitting, Cuff Size: Large)   Pulse 71   Ht 5' 2" (1 575 m)   Wt 117 kg (257 lb)   SpO2 97%   BMI 47 01 kg/m²          Physical Exam  Vitals reviewed  Constitutional:       General: She is not in acute distress  Appearance: She is well-developed  She is not diaphoretic  HENT:      Head: Normocephalic and atraumatic  Neck:      Thyroid: No thyromegaly  Vascular: No JVD  Cardiovascular:      Rate and Rhythm: Normal rate and regular rhythm  Heart sounds: S1 normal and S2 normal  No murmur heard  No friction rub  No gallop  Pulmonary:      Effort: Pulmonary effort is normal  No respiratory distress  Breath sounds: No wheezing or rales  Chest:      Chest wall: No tenderness  Abdominal:      Palpations: Abdomen is soft  Musculoskeletal:      Cervical back: Normal range of motion and neck supple  Right lower leg: No edema  Left lower leg: No edema  Skin:     General: Skin is warm and dry  Neurological:      Mental Status: She is oriented to person, place, and time     Psychiatric:         Mood and Affect: Mood normal          Behavior: Behavior normal

## 2021-06-28 NOTE — ASSESSMENT & PLAN NOTE
Hyperlipidemia, stable and adequately controlled  The patient will continue atorvastatin at 10 mg daily

## 2021-06-28 NOTE — PROGRESS NOTES
Assessment/Plan:    Essential hypertension    Hypertension, stable and adequately controlled  We will continue present regimen  Palpitation    History cardiac arrhythmia with symptoms of palpitation stable  We will continue present regimen  Hyperlipidemia    Hyperlipidemia, stable and adequately controlled  The patient will continue atorvastatin at 10 mg daily  Diagnoses and all orders for this visit:    Essential hypertension    Palpitation    Mixed hyperlipidemia          Subjective:   Feels well  Patient ID: Justino Taveras is a 76 y o  female  The patient presented to this office for the purpose of cardiac follow-up  She is known to have history of hypertension and hyperlipidemia as well as symptoms palpitation  The patient been feeling rather well with no chest pain or shortness of breath  No significant symptoms of palpitation noted  No leg edema  No symptoms of dizziness or lightheadedness  The following portions of the patient's history were reviewed and updated as appropriate: allergies, current medications, past family history, past medical history, past social history, past surgical history and problem list     Review of Systems   Respiratory: Negative for apnea, cough, chest tightness, shortness of breath and wheezing  Cardiovascular: Negative for chest pain, palpitations and leg swelling  Gastrointestinal: Negative for abdominal pain  Neurological: Negative for dizziness and light-headedness  Psychiatric/Behavioral: Negative  Objective:  Stable cardiac-wise  /76 (BP Location: Left arm, Patient Position: Sitting, Cuff Size: Large)   Pulse 71   Ht 5' 2" (1 575 m)   Wt 117 kg (257 lb)   SpO2 97%   BMI 47 01 kg/m²          Physical Exam  Vitals reviewed  Constitutional:       General: She is not in acute distress  Appearance: She is well-developed  She is not diaphoretic  HENT:      Head: Normocephalic and atraumatic     Neck: Thyroid: No thyromegaly  Vascular: No JVD  Cardiovascular:      Rate and Rhythm: Normal rate and regular rhythm  Heart sounds: S1 normal and S2 normal  No murmur heard  No friction rub  No gallop  Pulmonary:      Effort: Pulmonary effort is normal  No respiratory distress  Breath sounds: No wheezing or rales  Chest:      Chest wall: No tenderness  Abdominal:      Palpations: Abdomen is soft  Musculoskeletal:      Cervical back: Normal range of motion and neck supple  Right lower leg: No edema  Left lower leg: No edema  Skin:     General: Skin is warm and dry  Neurological:      Mental Status: She is oriented to person, place, and time     Psychiatric:         Mood and Affect: Mood normal          Behavior: Behavior normal

## 2021-06-29 ENCOUNTER — APPOINTMENT (OUTPATIENT)
Dept: PHYSICAL THERAPY | Facility: CLINIC | Age: 66
End: 2021-06-29
Payer: MEDICARE

## 2021-08-21 ENCOUNTER — HOSPITAL ENCOUNTER (OUTPATIENT)
Dept: RADIOLOGY | Age: 66
Discharge: HOME | End: 2021-08-21
Payer: MEDICARE

## 2021-08-21 DIAGNOSIS — Z78.0 MENOPAUSE: ICD-10-CM

## 2021-08-21 PROCEDURE — 77080 DXA BONE DENSITY AXIAL: CPT

## 2021-12-18 DIAGNOSIS — T18.128A FOOD IMPACTION OF ESOPHAGUS, INITIAL ENCOUNTER: ICD-10-CM

## 2021-12-18 DIAGNOSIS — E78.2 MIXED HYPERLIPIDEMIA: ICD-10-CM

## 2021-12-18 DIAGNOSIS — I10 ESSENTIAL HYPERTENSION: ICD-10-CM

## 2021-12-18 RX ORDER — PANTOPRAZOLE SODIUM 40 MG/1
TABLET, DELAYED RELEASE ORAL
Qty: 90 TABLET | Refills: 3 | Status: SHIPPED | OUTPATIENT
Start: 2021-12-18

## 2021-12-18 RX ORDER — ATORVASTATIN CALCIUM 10 MG/1
TABLET, FILM COATED ORAL
Qty: 90 TABLET | Refills: 3 | Status: SHIPPED | OUTPATIENT
Start: 2021-12-18

## 2021-12-18 RX ORDER — SPIRONOLACTONE 100 MG/1
100 TABLET, FILM COATED ORAL 2 TIMES DAILY
Qty: 180 TABLET | Refills: 0 | Status: SHIPPED | OUTPATIENT
Start: 2021-12-18 | End: 2022-03-23 | Stop reason: SDUPTHER

## 2021-12-18 RX ORDER — NADOLOL 20 MG/1
20 TABLET ORAL DAILY
Qty: 90 TABLET | Refills: 0 | Status: SHIPPED | OUTPATIENT
Start: 2021-12-18 | End: 2022-03-23 | Stop reason: SDUPTHER

## 2021-12-20 RX ORDER — HYDROCHLOROTHIAZIDE 25 MG/1
25 TABLET ORAL DAILY
Qty: 90 TABLET | Refills: 0 | Status: SHIPPED | OUTPATIENT
Start: 2021-12-20 | End: 2022-02-02 | Stop reason: SDUPTHER

## 2021-12-27 ENCOUNTER — OFFICE VISIT (OUTPATIENT)
Dept: CARDIOLOGY CLINIC | Facility: CLINIC | Age: 66
End: 2021-12-27
Payer: MEDICARE

## 2021-12-27 VITALS
SYSTOLIC BLOOD PRESSURE: 128 MMHG | BODY MASS INDEX: 48.03 KG/M2 | WEIGHT: 261 LBS | DIASTOLIC BLOOD PRESSURE: 76 MMHG | HEIGHT: 62 IN | HEART RATE: 69 BPM | OXYGEN SATURATION: 98 %

## 2021-12-27 DIAGNOSIS — E66.01 MORBID OBESITY (HCC): ICD-10-CM

## 2021-12-27 DIAGNOSIS — R00.2 PALPITATION: ICD-10-CM

## 2021-12-27 DIAGNOSIS — E78.2 MIXED HYPERLIPIDEMIA: ICD-10-CM

## 2021-12-27 DIAGNOSIS — I10 ESSENTIAL HYPERTENSION: Primary | ICD-10-CM

## 2021-12-27 PROCEDURE — 99214 OFFICE O/P EST MOD 30 MIN: CPT | Performed by: INTERNAL MEDICINE

## 2021-12-27 RX ORDER — FAMOTIDINE 10 MG
10 TABLET ORAL 2 TIMES DAILY
COMMUNITY

## 2022-02-02 DIAGNOSIS — E78.2 MIXED HYPERLIPIDEMIA: ICD-10-CM

## 2022-02-02 DIAGNOSIS — I10 ESSENTIAL HYPERTENSION: ICD-10-CM

## 2022-02-02 RX ORDER — HYDROCHLOROTHIAZIDE 25 MG/1
25 TABLET ORAL DAILY
Qty: 90 TABLET | Refills: 0 | Status: SHIPPED | OUTPATIENT
Start: 2022-02-02

## 2022-02-14 ENCOUNTER — APPOINTMENT (OUTPATIENT)
Dept: LAB | Age: 67
End: 2022-02-14
Payer: MEDICARE

## 2022-02-14 DIAGNOSIS — I10 ESSENTIAL HYPERTENSION: ICD-10-CM

## 2022-02-14 DIAGNOSIS — E28.2 POLYCYSTIC OVARIES: ICD-10-CM

## 2022-02-14 DIAGNOSIS — E78.2 MIXED HYPERLIPIDEMIA: ICD-10-CM

## 2022-02-14 DIAGNOSIS — R73.03 DIABETES MELLITUS, LATENT: ICD-10-CM

## 2022-02-14 DIAGNOSIS — R00.2 PALPITATION: ICD-10-CM

## 2022-02-14 LAB
ALBUMIN SERPL BCP-MCNC: 3.6 G/DL (ref 3.5–5)
ALP SERPL-CCNC: 72 U/L (ref 46–116)
ALT SERPL W P-5'-P-CCNC: 18 U/L (ref 12–78)
ANION GAP SERPL CALCULATED.3IONS-SCNC: 8 MMOL/L (ref 4–13)
AST SERPL W P-5'-P-CCNC: 16 U/L (ref 5–45)
BASOPHILS # BLD AUTO: 0.08 THOUSANDS/ΜL (ref 0–0.1)
BASOPHILS NFR BLD AUTO: 1 % (ref 0–1)
BILIRUB SERPL-MCNC: 0.59 MG/DL (ref 0.2–1)
BUN SERPL-MCNC: 19 MG/DL (ref 5–25)
CALCIUM SERPL-MCNC: 9.1 MG/DL (ref 8.3–10.1)
CHLORIDE SERPL-SCNC: 104 MMOL/L (ref 100–108)
CHOLEST SERPL-MCNC: 141 MG/DL
CO2 SERPL-SCNC: 25 MMOL/L (ref 21–32)
CREAT SERPL-MCNC: 0.97 MG/DL (ref 0.6–1.3)
EOSINOPHIL # BLD AUTO: 0.05 THOUSAND/ΜL (ref 0–0.61)
EOSINOPHIL NFR BLD AUTO: 1 % (ref 0–6)
ERYTHROCYTE [DISTWIDTH] IN BLOOD BY AUTOMATED COUNT: 14 % (ref 11.6–15.1)
EST. AVERAGE GLUCOSE BLD GHB EST-MCNC: 126 MG/DL
GFR SERPL CREATININE-BSD FRML MDRD: 60 ML/MIN/1.73SQ M
GLUCOSE P FAST SERPL-MCNC: 97 MG/DL (ref 65–99)
HBA1C MFR BLD: 6 %
HCT VFR BLD AUTO: 48.2 % (ref 34.8–46.1)
HDLC SERPL-MCNC: 50 MG/DL
HGB BLD-MCNC: 15.2 G/DL (ref 11.5–15.4)
IMM GRANULOCYTES # BLD AUTO: 0.02 THOUSAND/UL (ref 0–0.2)
IMM GRANULOCYTES NFR BLD AUTO: 0 % (ref 0–2)
LDLC SERPL CALC-MCNC: 74 MG/DL (ref 0–100)
LYMPHOCYTES # BLD AUTO: 2.27 THOUSANDS/ΜL (ref 0.6–4.47)
LYMPHOCYTES NFR BLD AUTO: 33 % (ref 14–44)
MCH RBC QN AUTO: 28.5 PG (ref 26.8–34.3)
MCHC RBC AUTO-ENTMCNC: 31.5 G/DL (ref 31.4–37.4)
MCV RBC AUTO: 90 FL (ref 82–98)
MONOCYTES # BLD AUTO: 0.68 THOUSAND/ΜL (ref 0.17–1.22)
MONOCYTES NFR BLD AUTO: 10 % (ref 4–12)
NEUTROPHILS # BLD AUTO: 3.76 THOUSANDS/ΜL (ref 1.85–7.62)
NEUTS SEG NFR BLD AUTO: 55 % (ref 43–75)
NONHDLC SERPL-MCNC: 91 MG/DL
NRBC BLD AUTO-RTO: 0 /100 WBCS
PLATELET # BLD AUTO: 238 THOUSANDS/UL (ref 149–390)
PMV BLD AUTO: 10.5 FL (ref 8.9–12.7)
POTASSIUM SERPL-SCNC: 4.1 MMOL/L (ref 3.5–5.3)
PROT SERPL-MCNC: 7.3 G/DL (ref 6.4–8.2)
RBC # BLD AUTO: 5.33 MILLION/UL (ref 3.81–5.12)
SODIUM SERPL-SCNC: 137 MMOL/L (ref 136–145)
TRIGL SERPL-MCNC: 86 MG/DL
TSH SERPL DL<=0.05 MIU/L-ACNC: 3.07 UIU/ML (ref 0.36–3.74)
WBC # BLD AUTO: 6.86 THOUSAND/UL (ref 4.31–10.16)

## 2022-02-14 PROCEDURE — 85025 COMPLETE CBC W/AUTO DIFF WBC: CPT

## 2022-02-14 PROCEDURE — 84443 ASSAY THYROID STIM HORMONE: CPT | Performed by: INTERNAL MEDICINE

## 2022-02-14 PROCEDURE — 80061 LIPID PANEL: CPT

## 2022-02-14 PROCEDURE — 80053 COMPREHEN METABOLIC PANEL: CPT

## 2022-02-14 PROCEDURE — 83036 HEMOGLOBIN GLYCOSYLATED A1C: CPT

## 2022-02-14 PROCEDURE — 36415 COLL VENOUS BLD VENIPUNCTURE: CPT

## 2022-03-23 DIAGNOSIS — E78.2 MIXED HYPERLIPIDEMIA: ICD-10-CM

## 2022-03-23 DIAGNOSIS — I10 ESSENTIAL HYPERTENSION: ICD-10-CM

## 2022-03-23 RX ORDER — SPIRONOLACTONE 100 MG/1
100 TABLET, FILM COATED ORAL 2 TIMES DAILY
Qty: 180 TABLET | Refills: 3 | Status: SHIPPED | OUTPATIENT
Start: 2022-03-23

## 2022-03-23 RX ORDER — NADOLOL 20 MG/1
20 TABLET ORAL DAILY
Qty: 90 TABLET | Refills: 0 | Status: SHIPPED | OUTPATIENT
Start: 2022-03-23 | End: 2022-06-20

## 2022-06-18 DIAGNOSIS — E78.2 MIXED HYPERLIPIDEMIA: ICD-10-CM

## 2022-06-18 DIAGNOSIS — I10 ESSENTIAL HYPERTENSION: ICD-10-CM

## 2022-06-20 DIAGNOSIS — E78.2 MIXED HYPERLIPIDEMIA: ICD-10-CM

## 2022-06-20 DIAGNOSIS — I10 ESSENTIAL HYPERTENSION: ICD-10-CM

## 2022-06-20 RX ORDER — NADOLOL 20 MG/1
TABLET ORAL
Qty: 90 TABLET | Refills: 0 | Status: SHIPPED | OUTPATIENT
Start: 2022-06-20 | End: 2022-06-20 | Stop reason: SDUPTHER

## 2022-06-20 RX ORDER — NADOLOL 20 MG/1
20 TABLET ORAL DAILY
Qty: 90 TABLET | Refills: 0 | Status: SHIPPED | OUTPATIENT
Start: 2022-06-20

## 2022-06-27 ENCOUNTER — OFFICE VISIT (OUTPATIENT)
Dept: CARDIOLOGY CLINIC | Facility: CLINIC | Age: 67
End: 2022-06-27
Payer: MEDICARE

## 2022-06-27 VITALS
HEART RATE: 70 BPM | OXYGEN SATURATION: 98 % | WEIGHT: 256.2 LBS | DIASTOLIC BLOOD PRESSURE: 82 MMHG | SYSTOLIC BLOOD PRESSURE: 130 MMHG | BODY MASS INDEX: 47.15 KG/M2 | HEIGHT: 62 IN

## 2022-06-27 DIAGNOSIS — E78.2 MIXED HYPERLIPIDEMIA: ICD-10-CM

## 2022-06-27 DIAGNOSIS — R00.2 PALPITATION: ICD-10-CM

## 2022-06-27 DIAGNOSIS — I10 ESSENTIAL HYPERTENSION: Primary | ICD-10-CM

## 2022-06-27 PROCEDURE — 99214 OFFICE O/P EST MOD 30 MIN: CPT | Performed by: INTERNAL MEDICINE

## 2022-06-27 RX ORDER — FEXOFENADINE HYDROCHLORIDE 60 MG/1
60 TABLET, FILM COATED ORAL DAILY
COMMUNITY

## 2022-06-27 NOTE — PROGRESS NOTES
Assessment/Plan:    Essential hypertension    Hypertension, stable and adequately controlled  Palpitation   History of palpitation, stable    Hyperlipidemia   Hyperlipidemia, stable  The patient continue atorvastatin at 10 mg daily  Diagnoses and all orders for this visit:    Essential hypertension    Mixed hyperlipidemia    Palpitation    Other orders  -     Cholecalciferol 25 MCG (1000 UT) tablet; Take 1,000 Units by mouth daily  -     fexofenadine (ALLEGRA) 60 MG tablet; Take 60 mg by mouth daily          Subjective:   Feels well  Patient ID: Tiffany Mai is a 78 y o  female  The patient presented to this office for the purpose cardiac follow-up  She is known to have history of hypertension hyperlipidemia as symptoms of palpitation  The patient been feeling rather well  She denies any symptoms of chest pain, shortness of breath palpitation, dizziness or lightheadedness  She has no leg edema  The following portions of the patient's history were reviewed and updated as appropriate: allergies, current medications, past family history, past medical history, past social history, past surgical history and problem list     Review of Systems   Respiratory: Negative for apnea, cough, chest tightness, shortness of breath and wheezing  Cardiovascular: Negative for chest pain, palpitations and leg swelling  Gastrointestinal: Negative for abdominal pain  Neurological: Negative for dizziness and light-headedness  Psychiatric/Behavioral: Negative  Objective:  Stable cardiac-wise  /82 (BP Location: Left arm, Patient Position: Sitting, Cuff Size: Large)   Pulse 70   Ht 5' 2" (1 575 m)   Wt 116 kg (256 lb 3 2 oz)   SpO2 98%   BMI 46 86 kg/m²          Physical Exam  Vitals reviewed  Constitutional:       General: She is not in acute distress  Appearance: She is well-developed  She is not diaphoretic  HENT:      Head: Normocephalic and atraumatic     Neck: Thyroid: No thyromegaly  Vascular: No JVD  Cardiovascular:      Rate and Rhythm: Normal rate and regular rhythm  Heart sounds: S1 normal and S2 normal  No murmur heard  No friction rub  No gallop  Pulmonary:      Effort: Pulmonary effort is normal  No respiratory distress  Breath sounds: No wheezing or rales  Chest:      Chest wall: No tenderness  Abdominal:      Palpations: Abdomen is soft  Musculoskeletal:      Cervical back: Normal range of motion and neck supple  Right lower leg: No edema  Left lower leg: No edema  Skin:     General: Skin is warm and dry  Neurological:      Mental Status: She is oriented to person, place, and time     Psychiatric:         Mood and Affect: Mood normal          Behavior: Behavior normal

## 2022-06-27 NOTE — LETTER
June 27, 2022     Bryna Fabry, [address] [address]    Patient: Rosalva Alegria   YOB: 1955   Date of Visit: 6/27/2022       Dear Dr Martina Abrams: Thank you for referring Naman Dominguez to me for evaluation  Below are my notes for this consultation  If you have questions, please do not hesitate to call me  I look forward to following your patient along with you  Sincerely,        Philomena Pickett MD        CC: No Recipients  Philomena Pickett MD  6/27/2022 11:34 AM  Sign when Signing Visit  Assessment/Plan:    Essential hypertension    Hypertension, stable and adequately controlled  Palpitation   History of palpitation, stable    Hyperlipidemia   Hyperlipidemia, stable  The patient continue atorvastatin at 10 mg daily  Diagnoses and all orders for this visit:    Essential hypertension    Mixed hyperlipidemia    Palpitation    Other orders  -     Cholecalciferol 25 MCG (1000 UT) tablet; Take 1,000 Units by mouth daily  -     fexofenadine (ALLEGRA) 60 MG tablet; Take 60 mg by mouth daily          Subjective:   Feels well  Patient ID: Rosalva Alegria is a 78 y o  female  The patient presented to this office for the purpose cardiac follow-up  She is known to have history of hypertension hyperlipidemia as symptoms of palpitation  The patient been feeling rather well  She denies any symptoms of chest pain, shortness of breath palpitation, dizziness or lightheadedness  She has no leg edema  The following portions of the patient's history were reviewed and updated as appropriate: allergies, current medications, past family history, past medical history, past social history, past surgical history and problem list     Review of Systems   Respiratory: Negative for apnea, cough, chest tightness, shortness of breath and wheezing  Cardiovascular: Negative for chest pain, palpitations and leg swelling     Gastrointestinal: Negative for abdominal pain    Neurological: Negative for dizziness and light-headedness  Psychiatric/Behavioral: Negative  Objective:  Stable cardiac-wise  /82 (BP Location: Left arm, Patient Position: Sitting, Cuff Size: Large)   Pulse 70   Ht 5' 2" (1 575 m)   Wt 116 kg (256 lb 3 2 oz)   SpO2 98%   BMI 46 86 kg/m²          Physical Exam  Vitals reviewed  Constitutional:       General: She is not in acute distress  Appearance: She is well-developed  She is not diaphoretic  HENT:      Head: Normocephalic and atraumatic  Neck:      Thyroid: No thyromegaly  Vascular: No JVD  Cardiovascular:      Rate and Rhythm: Normal rate and regular rhythm  Heart sounds: S1 normal and S2 normal  No murmur heard  No friction rub  No gallop  Pulmonary:      Effort: Pulmonary effort is normal  No respiratory distress  Breath sounds: No wheezing or rales  Chest:      Chest wall: No tenderness  Abdominal:      Palpations: Abdomen is soft  Musculoskeletal:      Cervical back: Normal range of motion and neck supple  Right lower leg: No edema  Left lower leg: No edema  Skin:     General: Skin is warm and dry  Neurological:      Mental Status: She is oriented to person, place, and time     Psychiatric:         Mood and Affect: Mood normal          Behavior: Behavior normal

## 2022-07-18 ENCOUNTER — HOSPITAL ENCOUNTER (OUTPATIENT)
Dept: RADIOLOGY | Age: 67
Discharge: HOME | End: 2022-07-18
Payer: MEDICARE

## 2022-07-18 VITALS — BODY MASS INDEX: 47.11 KG/M2 | HEIGHT: 62 IN | WEIGHT: 256 LBS

## 2022-07-18 DIAGNOSIS — Z12.31 SCREENING MAMMOGRAM FOR HIGH-RISK PATIENT: ICD-10-CM

## 2022-07-18 PROCEDURE — 77063 BREAST TOMOSYNTHESIS BI: CPT

## 2022-07-18 PROCEDURE — 77067 SCR MAMMO BI INCL CAD: CPT

## 2022-09-13 DIAGNOSIS — I10 ESSENTIAL HYPERTENSION: ICD-10-CM

## 2022-09-13 DIAGNOSIS — E78.2 MIXED HYPERLIPIDEMIA: ICD-10-CM

## 2022-09-14 RX ORDER — NADOLOL 20 MG/1
20 TABLET ORAL DAILY
Qty: 90 TABLET | Refills: 0 | Status: SHIPPED | OUTPATIENT
Start: 2022-09-14

## 2022-12-12 DIAGNOSIS — I10 ESSENTIAL HYPERTENSION: ICD-10-CM

## 2022-12-12 DIAGNOSIS — E78.2 MIXED HYPERLIPIDEMIA: ICD-10-CM

## 2022-12-12 DIAGNOSIS — T18.128A FOOD IMPACTION OF ESOPHAGUS, INITIAL ENCOUNTER: ICD-10-CM

## 2022-12-13 DIAGNOSIS — T18.128A FOOD IMPACTION OF ESOPHAGUS, INITIAL ENCOUNTER: ICD-10-CM

## 2022-12-13 DIAGNOSIS — E78.2 MIXED HYPERLIPIDEMIA: ICD-10-CM

## 2022-12-13 DIAGNOSIS — I10 ESSENTIAL HYPERTENSION: ICD-10-CM

## 2022-12-13 RX ORDER — NADOLOL 20 MG/1
20 TABLET ORAL DAILY
Qty: 90 TABLET | Refills: 0 | Status: SHIPPED | OUTPATIENT
Start: 2022-12-13

## 2022-12-13 RX ORDER — ATORVASTATIN CALCIUM 10 MG/1
10 TABLET, FILM COATED ORAL DAILY
Qty: 90 TABLET | Refills: 0 | Status: SHIPPED | OUTPATIENT
Start: 2022-12-13

## 2022-12-13 RX ORDER — SPIRONOLACTONE 100 MG/1
100 TABLET, FILM COATED ORAL 2 TIMES DAILY
Qty: 180 TABLET | Refills: 0 | Status: SHIPPED | OUTPATIENT
Start: 2022-12-13

## 2022-12-13 RX ORDER — PANTOPRAZOLE SODIUM 40 MG/1
40 TABLET, DELAYED RELEASE ORAL DAILY
Qty: 90 TABLET | Refills: 0 | Status: SHIPPED | OUTPATIENT
Start: 2022-12-13

## 2022-12-13 RX ORDER — PANTOPRAZOLE SODIUM 40 MG/1
TABLET, DELAYED RELEASE ORAL
Qty: 90 TABLET | Refills: 3 | Status: SHIPPED | OUTPATIENT
Start: 2022-12-13

## 2022-12-13 RX ORDER — ATORVASTATIN CALCIUM 10 MG/1
TABLET, FILM COATED ORAL
Qty: 90 TABLET | Refills: 3 | Status: SHIPPED | OUTPATIENT
Start: 2022-12-13

## 2023-01-03 ENCOUNTER — OFFICE VISIT (OUTPATIENT)
Dept: CARDIOLOGY CLINIC | Facility: CLINIC | Age: 68
End: 2023-01-03

## 2023-01-03 VITALS
SYSTOLIC BLOOD PRESSURE: 126 MMHG | HEIGHT: 62 IN | DIASTOLIC BLOOD PRESSURE: 70 MMHG | WEIGHT: 256 LBS | HEART RATE: 56 BPM | BODY MASS INDEX: 47.11 KG/M2 | OXYGEN SATURATION: 98 %

## 2023-01-03 DIAGNOSIS — I10 ESSENTIAL HYPERTENSION: Primary | ICD-10-CM

## 2023-01-03 DIAGNOSIS — E78.2 MIXED HYPERLIPIDEMIA: ICD-10-CM

## 2023-01-03 DIAGNOSIS — R00.2 PALPITATION: ICD-10-CM

## 2023-01-03 NOTE — PROGRESS NOTES
Assessment/Plan:    Essential hypertension  Hypertension, stable and adequately controlled  Palpitation  Patient has a history of tachycardia that has been asymptomatic so far on current regimen of medications  We will continue the same  Hyperlipidemia  Hyperlipidemia, stable  Diagnoses and all orders for this visit:    Essential hypertension  -     CBC and differential; Future  -     Comprehensive metabolic panel; Future  -     Lipid Panel with Direct LDL reflex; Future  -     TSH, 3rd generation    Mixed hyperlipidemia  -     CBC and differential; Future  -     Comprehensive metabolic panel; Future  -     Lipid Panel with Direct LDL reflex; Future  -     TSH, 3rd generation    Palpitation          Subjective: Feels well  Patient ID: Ever Gomes is a 75 y o  female  The patient presented to this office for the purpose of cardiac follow-up  She is known to have a history of hypertension and hyperlipidemia  She has a history of tendency for sinus tachycardia associated with symptoms of palpitation  At this point the patient is feeling rather well  She denies any symptoms of chest pain, shortness of breath, palpitation, dizziness or lightheadedness  She has no leg edema  The following portions of the patient's history were reviewed and updated as appropriate: allergies, current medications, past family history, past medical history, past social history, past surgical history and problem list     Review of Systems   Respiratory: Negative for apnea, cough, chest tightness, shortness of breath and wheezing  Cardiovascular: Negative for chest pain, palpitations and leg swelling  Gastrointestinal: Negative for abdominal pain  Neurological: Negative for dizziness and light-headedness  Psychiatric/Behavioral: Negative  Objective: Stable cardiac wise        /70 (BP Location: Left arm, Patient Position: Sitting, Cuff Size: Large)   Pulse 56   Ht 5' 2" (1 575 m)   Wt 116 kg (256 lb)   SpO2 98%   BMI 46 82 kg/m²          Physical Exam  Vitals reviewed  Constitutional:       General: She is not in acute distress  Appearance: She is well-developed  She is not diaphoretic  HENT:      Head: Normocephalic and atraumatic  Neck:      Thyroid: No thyromegaly  Vascular: No JVD  Cardiovascular:      Rate and Rhythm: Normal rate and regular rhythm  Heart sounds: S1 normal and S2 normal  No murmur heard  No friction rub  No gallop  Pulmonary:      Effort: Pulmonary effort is normal  No respiratory distress  Breath sounds: No wheezing or rales  Chest:      Chest wall: No tenderness  Abdominal:      Palpations: Abdomen is soft  Musculoskeletal:      Cervical back: Normal range of motion and neck supple  Right lower leg: No edema  Left lower leg: No edema  Skin:     General: Skin is warm and dry  Neurological:      Mental Status: She is oriented to person, place, and time     Psychiatric:         Mood and Affect: Mood normal          Behavior: Behavior normal

## 2023-01-03 NOTE — LETTER
January 3, 2023     Osmel Westphalia, [address] [address]    Patient: Bianca Suarez   YOB: 1955   Date of Visit: 1/3/2023       Dear Dr Jeffy Tariq: Thank you for referring Nolviafrancy Menezes to me for evaluation  Below are my notes for this consultation  If you have questions, please do not hesitate to call me  I look forward to following your patient along with you  Sincerely,        Edvin Chavez MD        CC: No Recipients  Edvin Chavez MD  1/3/2023 10:03 AM  Sign when Signing Visit  Assessment/Plan:    Essential hypertension  Hypertension, stable and adequately controlled  Palpitation  Patient has a history of tachycardia that has been asymptomatic so far on current regimen of medications  We will continue the same  Hyperlipidemia  Hyperlipidemia, stable  Diagnoses and all orders for this visit:    Essential hypertension  -     CBC and differential; Future  -     Comprehensive metabolic panel; Future  -     Lipid Panel with Direct LDL reflex; Future  -     TSH, 3rd generation    Mixed hyperlipidemia  -     CBC and differential; Future  -     Comprehensive metabolic panel; Future  -     Lipid Panel with Direct LDL reflex; Future  -     TSH, 3rd generation    Palpitation         Subjective: Feels well  Patient ID: Bianca Suarez is a 75 y o  female  The patient presented to this office for the purpose of cardiac follow-up  She is known to have a history of hypertension and hyperlipidemia  She has a history of tendency for sinus tachycardia associated with symptoms of palpitation  At this point the patient is feeling rather well  She denies any symptoms of chest pain, shortness of breath, palpitation, dizziness or lightheadedness  She has no leg edema        The following portions of the patient's history were reviewed and updated as appropriate: allergies, current medications, past family history, past medical history, past social history, past surgical history and problem list     Review of Systems   Respiratory: Negative for apnea, cough, chest tightness, shortness of breath and wheezing  Cardiovascular: Negative for chest pain, palpitations and leg swelling  Gastrointestinal: Negative for abdominal pain  Neurological: Negative for dizziness and light-headedness  Psychiatric/Behavioral: Negative  Objective: Stable cardiac wise  /70 (BP Location: Left arm, Patient Position: Sitting, Cuff Size: Large)   Pulse 56   Ht 5' 2" (1 575 m)   Wt 116 kg (256 lb)   SpO2 98%   BMI 46 82 kg/m²         Physical Exam  Vitals reviewed  Constitutional:       General: She is not in acute distress  Appearance: She is well-developed  She is not diaphoretic  HENT:      Head: Normocephalic and atraumatic  Neck:      Thyroid: No thyromegaly  Vascular: No JVD  Cardiovascular:      Rate and Rhythm: Normal rate and regular rhythm  Heart sounds: S1 normal and S2 normal  No murmur heard  No friction rub  No gallop  Pulmonary:      Effort: Pulmonary effort is normal  No respiratory distress  Breath sounds: No wheezing or rales  Chest:      Chest wall: No tenderness  Abdominal:      Palpations: Abdomen is soft  Musculoskeletal:      Cervical back: Normal range of motion and neck supple  Right lower leg: No edema  Left lower leg: No edema  Skin:     General: Skin is warm and dry  Neurological:      Mental Status: She is oriented to person, place, and time     Psychiatric:         Mood and Affect: Mood normal          Behavior: Behavior normal

## 2023-01-03 NOTE — ASSESSMENT & PLAN NOTE
Patient has a history of tachycardia that has been asymptomatic so far on current regimen of medications  We will continue the same

## 2023-03-12 DIAGNOSIS — I10 ESSENTIAL HYPERTENSION: ICD-10-CM

## 2023-03-12 DIAGNOSIS — E78.2 MIXED HYPERLIPIDEMIA: ICD-10-CM

## 2023-03-12 DIAGNOSIS — T18.128A FOOD IMPACTION OF ESOPHAGUS, INITIAL ENCOUNTER: ICD-10-CM

## 2023-03-13 DIAGNOSIS — I10 ESSENTIAL HYPERTENSION: ICD-10-CM

## 2023-03-13 DIAGNOSIS — E78.2 MIXED HYPERLIPIDEMIA: ICD-10-CM

## 2023-03-13 RX ORDER — NADOLOL 20 MG/1
20 TABLET ORAL DAILY
Qty: 90 TABLET | Refills: 0 | Status: SHIPPED | OUTPATIENT
Start: 2023-03-13

## 2023-03-13 RX ORDER — ATORVASTATIN CALCIUM 10 MG/1
TABLET, FILM COATED ORAL
Qty: 90 TABLET | Refills: 0 | Status: SHIPPED | OUTPATIENT
Start: 2023-03-13

## 2023-03-13 RX ORDER — PANTOPRAZOLE SODIUM 40 MG/1
40 TABLET, DELAYED RELEASE ORAL DAILY
Qty: 90 TABLET | Refills: 0 | Status: SHIPPED | OUTPATIENT
Start: 2023-03-13

## 2023-03-13 RX ORDER — ATORVASTATIN CALCIUM 10 MG/1
10 TABLET, FILM COATED ORAL DAILY
Qty: 90 TABLET | Refills: 0 | Status: SHIPPED | OUTPATIENT
Start: 2023-03-13

## 2023-03-13 RX ORDER — HYDROCHLOROTHIAZIDE 25 MG/1
25 TABLET ORAL DAILY
Qty: 90 TABLET | Refills: 0 | Status: SHIPPED | OUTPATIENT
Start: 2023-03-13

## 2023-03-13 RX ORDER — SPIRONOLACTONE 100 MG/1
100 TABLET, FILM COATED ORAL 2 TIMES DAILY
Qty: 180 TABLET | Refills: 0 | Status: SHIPPED | OUTPATIENT
Start: 2023-03-13

## 2023-05-13 ENCOUNTER — APPOINTMENT (OUTPATIENT)
Dept: LAB | Age: 68
End: 2023-05-13

## 2023-05-13 DIAGNOSIS — E78.2 MIXED HYPERLIPIDEMIA: ICD-10-CM

## 2023-05-13 DIAGNOSIS — I10 ESSENTIAL HYPERTENSION: ICD-10-CM

## 2023-05-13 DIAGNOSIS — E28.2 POLYCYSTIC OVARIES: ICD-10-CM

## 2023-05-13 LAB
ALBUMIN SERPL BCP-MCNC: 3.8 G/DL (ref 3.5–5)
ALP SERPL-CCNC: 63 U/L (ref 46–116)
ALT SERPL W P-5'-P-CCNC: 17 U/L (ref 12–78)
ANION GAP SERPL CALCULATED.3IONS-SCNC: 4 MMOL/L (ref 4–13)
AST SERPL W P-5'-P-CCNC: 15 U/L (ref 5–45)
BASOPHILS # BLD AUTO: 0.05 THOUSANDS/ÂΜL (ref 0–0.1)
BASOPHILS NFR BLD AUTO: 1 % (ref 0–1)
BILIRUB SERPL-MCNC: 0.53 MG/DL (ref 0.2–1)
BUN SERPL-MCNC: 19 MG/DL (ref 5–25)
CALCIUM SERPL-MCNC: 9.4 MG/DL (ref 8.3–10.1)
CHLORIDE SERPL-SCNC: 105 MMOL/L (ref 96–108)
CHOLEST SERPL-MCNC: 142 MG/DL
CO2 SERPL-SCNC: 26 MMOL/L (ref 21–32)
CREAT SERPL-MCNC: 1.02 MG/DL (ref 0.6–1.3)
EOSINOPHIL # BLD AUTO: 0.08 THOUSAND/ÂΜL (ref 0–0.61)
EOSINOPHIL NFR BLD AUTO: 1 % (ref 0–6)
ERYTHROCYTE [DISTWIDTH] IN BLOOD BY AUTOMATED COUNT: 14.4 % (ref 11.6–15.1)
EST. AVERAGE GLUCOSE BLD GHB EST-MCNC: 123 MG/DL
GFR SERPL CREATININE-BSD FRML MDRD: 56 ML/MIN/1.73SQ M
GLUCOSE P FAST SERPL-MCNC: 108 MG/DL (ref 65–99)
HBA1C MFR BLD: 5.9 %
HCT VFR BLD AUTO: 43.9 % (ref 34.8–46.1)
HDLC SERPL-MCNC: 52 MG/DL
HGB BLD-MCNC: 14.2 G/DL (ref 11.5–15.4)
IMM GRANULOCYTES # BLD AUTO: 0.02 THOUSAND/UL (ref 0–0.2)
IMM GRANULOCYTES NFR BLD AUTO: 0 % (ref 0–2)
LDLC SERPL CALC-MCNC: 74 MG/DL (ref 0–100)
LYMPHOCYTES # BLD AUTO: 2.02 THOUSANDS/ÂΜL (ref 0.6–4.47)
LYMPHOCYTES NFR BLD AUTO: 32 % (ref 14–44)
MCH RBC QN AUTO: 28.4 PG (ref 26.8–34.3)
MCHC RBC AUTO-ENTMCNC: 32.3 G/DL (ref 31.4–37.4)
MCV RBC AUTO: 88 FL (ref 82–98)
MONOCYTES # BLD AUTO: 0.69 THOUSAND/ÂΜL (ref 0.17–1.22)
MONOCYTES NFR BLD AUTO: 11 % (ref 4–12)
NEUTROPHILS # BLD AUTO: 3.42 THOUSANDS/ÂΜL (ref 1.85–7.62)
NEUTS SEG NFR BLD AUTO: 55 % (ref 43–75)
NRBC BLD AUTO-RTO: 0 /100 WBCS
PLATELET # BLD AUTO: 266 THOUSANDS/UL (ref 149–390)
PMV BLD AUTO: 11.7 FL (ref 8.9–12.7)
POTASSIUM SERPL-SCNC: 4 MMOL/L (ref 3.5–5.3)
PROT SERPL-MCNC: 7.4 G/DL (ref 6.4–8.4)
RBC # BLD AUTO: 5 MILLION/UL (ref 3.81–5.12)
SODIUM SERPL-SCNC: 135 MMOL/L (ref 135–147)
TRIGL SERPL-MCNC: 81 MG/DL
TSH SERPL DL<=0.05 MIU/L-ACNC: 2.84 UIU/ML (ref 0.45–4.5)
WBC # BLD AUTO: 6.28 THOUSAND/UL (ref 4.31–10.16)

## 2023-06-12 DIAGNOSIS — T18.128A FOOD IMPACTION OF ESOPHAGUS, INITIAL ENCOUNTER: ICD-10-CM

## 2023-06-12 DIAGNOSIS — E78.2 MIXED HYPERLIPIDEMIA: ICD-10-CM

## 2023-06-12 DIAGNOSIS — I10 ESSENTIAL HYPERTENSION: ICD-10-CM

## 2023-06-12 RX ORDER — ATORVASTATIN CALCIUM 10 MG/1
10 TABLET, FILM COATED ORAL DAILY
Qty: 90 TABLET | Refills: 0 | Status: SHIPPED | OUTPATIENT
Start: 2023-06-12

## 2023-06-12 RX ORDER — PANTOPRAZOLE SODIUM 40 MG/1
40 TABLET, DELAYED RELEASE ORAL DAILY
Qty: 90 TABLET | Refills: 0 | Status: SHIPPED | OUTPATIENT
Start: 2023-06-12

## 2023-06-12 RX ORDER — NADOLOL 20 MG/1
20 TABLET ORAL DAILY
Qty: 90 TABLET | Refills: 0 | Status: SHIPPED | OUTPATIENT
Start: 2023-06-12

## 2023-06-12 RX ORDER — SPIRONOLACTONE 100 MG/1
100 TABLET, FILM COATED ORAL 2 TIMES DAILY
Qty: 180 TABLET | Refills: 0 | Status: SHIPPED | OUTPATIENT
Start: 2023-06-12

## 2023-06-19 DIAGNOSIS — I10 ESSENTIAL HYPERTENSION: ICD-10-CM

## 2023-06-19 DIAGNOSIS — E78.2 MIXED HYPERLIPIDEMIA: ICD-10-CM

## 2023-06-19 RX ORDER — HYDROCHLOROTHIAZIDE 25 MG/1
TABLET ORAL
Qty: 90 TABLET | Refills: 0 | Status: SHIPPED | OUTPATIENT
Start: 2023-06-19

## 2023-06-20 ENCOUNTER — OFFICE VISIT (OUTPATIENT)
Dept: CARDIOLOGY CLINIC | Facility: CLINIC | Age: 68
End: 2023-06-20
Payer: MEDICARE

## 2023-06-20 VITALS
HEART RATE: 81 BPM | DIASTOLIC BLOOD PRESSURE: 70 MMHG | HEIGHT: 62 IN | WEIGHT: 258.4 LBS | BODY MASS INDEX: 47.55 KG/M2 | SYSTOLIC BLOOD PRESSURE: 128 MMHG | OXYGEN SATURATION: 98 %

## 2023-06-20 DIAGNOSIS — I10 ESSENTIAL HYPERTENSION: Primary | ICD-10-CM

## 2023-06-20 DIAGNOSIS — R00.2 PALPITATION: ICD-10-CM

## 2023-06-20 DIAGNOSIS — E78.2 MIXED HYPERLIPIDEMIA: ICD-10-CM

## 2023-06-20 PROCEDURE — 93000 ELECTROCARDIOGRAM COMPLETE: CPT | Performed by: INTERNAL MEDICINE

## 2023-06-20 PROCEDURE — 99213 OFFICE O/P EST LOW 20 MIN: CPT | Performed by: INTERNAL MEDICINE

## 2023-06-20 NOTE — ASSESSMENT & PLAN NOTE
The patient has a history of palpitation associated with tendency for sinus tachycardia  This has been stable  The patient is asymptomatic

## 2023-06-20 NOTE — LETTER
June 20, 2023     Dangelo Sahu, [address] [address]    Patient: Chip Jordan   YOB: 1955   Date of Visit: 6/20/2023       Dear Dr Justice More: Thank you for referring Francesco Croft to me for evaluation  Below are my notes for this consultation  If you have questions, please do not hesitate to call me  I look forward to following your patient along with you  Sincerely,        Kyra King MD        CC: No Recipients    Kyra King MD  6/20/2023  1:51 PM  Sign when Signing Visit  Assessment/Plan:    Essential hypertension  Hypertension, stable and adequately controlled  Palpitation  The patient has a history of palpitation associated with tendency for sinus tachycardia  This has been stable  The patient is asymptomatic  Hyperlipidemia  Hyperlipidemia, stable and adequately controlled  Diagnoses and all orders for this visit:    Essential hypertension  -     POCT ECG    Palpitation    Mixed hyperlipidemia         Subjective: Feels well  Patient ID: Chip Jordan is a 75 y o  female  The patient presented to this office for the purpose of cardiac follow-up  She is known to have a history of hypertension and hyperlipidemia  The patient also had a history of a tendency for sinus tachycardia associated with symptoms of palpitation  At this point the patient is feeling rather well  She denies any symptoms of chest pain, shortness of breath, palpitation, dizziness or lightheadedness  She has chronic lower extremity edema that has been stable  The following portions of the patient's history were reviewed and updated as appropriate: allergies, current medications, past family history, past medical history, past social history, past surgical history and problem list     Review of Systems   Respiratory: Negative for apnea, cough, chest tightness, shortness of breath and wheezing  Cardiovascular: Positive for leg swelling  "Negative for chest pain and palpitations  Gastrointestinal: Negative for abdominal pain  Neurological: Negative for dizziness and light-headedness  Psychiatric/Behavioral: Negative  Objective: Stable cardiac wise  /70 (BP Location: Left arm, Patient Position: Sitting, Cuff Size: Large)   Pulse 81   Ht 5' 2\" (1 575 m)   Wt 117 kg (258 lb 6 4 oz)   SpO2 98%   BMI 47 26 kg/m²         Physical Exam  Vitals reviewed  Constitutional:       General: She is not in acute distress  Appearance: She is well-developed  She is not diaphoretic  HENT:      Head: Normocephalic and atraumatic  Neck:      Thyroid: No thyromegaly  Vascular: No JVD  Cardiovascular:      Rate and Rhythm: Normal rate and regular rhythm  Heart sounds: S1 normal and S2 normal  No murmur heard  No friction rub  No gallop  Pulmonary:      Effort: Pulmonary effort is normal  No respiratory distress  Breath sounds: No wheezing or rales  Chest:      Chest wall: No tenderness  Abdominal:      Palpations: Abdomen is soft  Musculoskeletal:      Cervical back: Normal range of motion and neck supple  Right lower leg: Edema present  Left lower leg: Edema present  Skin:     General: Skin is warm and dry  Neurological:      Mental Status: She is oriented to person, place, and time     Psychiatric:         Mood and Affect: Mood normal          Behavior: Behavior normal           "

## 2023-06-20 NOTE — PROGRESS NOTES
"Assessment/Plan:    Essential hypertension  Hypertension, stable and adequately controlled  Palpitation  The patient has a history of palpitation associated with tendency for sinus tachycardia  This has been stable  The patient is asymptomatic  Hyperlipidemia  Hyperlipidemia, stable and adequately controlled  Diagnoses and all orders for this visit:    Essential hypertension  -     POCT ECG    Palpitation    Mixed hyperlipidemia          Subjective: Feels well  Patient ID: Donna Jackson is a 75 y o  female  The patient presented to this office for the purpose of cardiac follow-up  She is known to have a history of hypertension and hyperlipidemia  The patient also had a history of a tendency for sinus tachycardia associated with symptoms of palpitation  At this point the patient is feeling rather well  She denies any symptoms of chest pain, shortness of breath, palpitation, dizziness or lightheadedness  She has chronic lower extremity edema that has been stable  The following portions of the patient's history were reviewed and updated as appropriate: allergies, current medications, past family history, past medical history, past social history, past surgical history and problem list     Review of Systems   Respiratory: Negative for apnea, cough, chest tightness, shortness of breath and wheezing  Cardiovascular: Positive for leg swelling  Negative for chest pain and palpitations  Gastrointestinal: Negative for abdominal pain  Neurological: Negative for dizziness and light-headedness  Psychiatric/Behavioral: Negative  Objective: Stable cardiac wise  /70 (BP Location: Left arm, Patient Position: Sitting, Cuff Size: Large)   Pulse 81   Ht 5' 2\" (1 575 m)   Wt 117 kg (258 lb 6 4 oz)   SpO2 98%   BMI 47 26 kg/m²          Physical Exam  Vitals reviewed  Constitutional:       General: She is not in acute distress  Appearance: She is well-developed   She " is not diaphoretic  HENT:      Head: Normocephalic and atraumatic  Neck:      Thyroid: No thyromegaly  Vascular: No JVD  Cardiovascular:      Rate and Rhythm: Normal rate and regular rhythm  Heart sounds: S1 normal and S2 normal  No murmur heard  No friction rub  No gallop  Pulmonary:      Effort: Pulmonary effort is normal  No respiratory distress  Breath sounds: No wheezing or rales  Chest:      Chest wall: No tenderness  Abdominal:      Palpations: Abdomen is soft  Musculoskeletal:      Cervical back: Normal range of motion and neck supple  Right lower leg: Edema present  Left lower leg: Edema present  Skin:     General: Skin is warm and dry  Neurological:      Mental Status: She is oriented to person, place, and time     Psychiatric:         Mood and Affect: Mood normal          Behavior: Behavior normal

## 2023-07-01 ENCOUNTER — OFFICE VISIT (OUTPATIENT)
Dept: URGENT CARE | Age: 68
End: 2023-07-01
Payer: MEDICARE

## 2023-07-01 VITALS — TEMPERATURE: 97.8 F | RESPIRATION RATE: 18 BRPM | OXYGEN SATURATION: 99 % | HEART RATE: 88 BPM

## 2023-07-01 DIAGNOSIS — H66.91 RIGHT OTITIS MEDIA, UNSPECIFIED OTITIS MEDIA TYPE: ICD-10-CM

## 2023-07-01 DIAGNOSIS — H61.21 HEARING LOSS OF RIGHT EAR DUE TO CERUMEN IMPACTION: Primary | ICD-10-CM

## 2023-07-01 PROCEDURE — G0463 HOSPITAL OUTPT CLINIC VISIT: HCPCS | Performed by: PHYSICIAN ASSISTANT

## 2023-07-01 PROCEDURE — 99213 OFFICE O/P EST LOW 20 MIN: CPT | Performed by: PHYSICIAN ASSISTANT

## 2023-07-01 RX ORDER — AZITHROMYCIN 250 MG/1
TABLET, FILM COATED ORAL
Qty: 6 TABLET | Refills: 0 | Status: SHIPPED | OUTPATIENT
Start: 2023-07-01 | End: 2023-07-05

## 2023-07-01 NOTE — PROGRESS NOTES
[facility]        NAME: Chip Jordan is a 75 y o  female  : 1955    MRN: [id number]  DATE: 2023  TIME: 1:18 PM    Assessment and Plan   Hearing loss of right ear due to cerumen impaction [H61 21]  1  Hearing loss of right ear due to cerumen impaction  Ear cerumen removal      2  Right otitis media, unspecified otitis media type  azithromycin (ZITHROMAX) 250 mg tablet        Ear cerumen removal    Date/Time: 2023 12:30 PM    Performed by: Nahomi Hawk PA-C  Authorized by: Nahomi Hawk PA-C  Universal Protocol:  Consent: Verbal consent obtained  Consent given by: patient  Patient identity confirmed: verbally with patient      Procedure details:     Local anesthetic:  None    Location:  R ear    Procedure type: irrigation with instrumentation      Instrumentation: curette      Approach:  External  Post-procedure details:     Complication:  None    Hearing quality:  Improved    Patient tolerance of procedure: Tolerated with difficulty        Patient Instructions     Patient was educated on right ear infection  Patient was told to keep right ear clean and dry  Patient was educated on antibiotics  Patient was told to eat on antibiotics  Chief Complaint     Chief Complaint   Patient presents with   • Earache     Patient been dealing with her right ear feeling clogged and uncomfortable- she been using Flonase and Debrox         History of Present Illness       Patient is here today complaining of right ear pain and decreased hearing for three days  Patient reports history of ear infection  Allergies were reviewed in chart  Denies any other symptoms  Review of Systems   Review of Systems   Constitutional: Negative  HENT: Positive for ear pain and hearing loss  Respiratory: Negative  Cardiovascular: Negative  Psychiatric/Behavioral: Negative            Current Medications       Current Outpatient Medications:   •  azithromycin (ZITHROMAX) 250 mg tablet, Take 2 tablets today then 1 tablet daily x 4 days, Disp: 6 tablet, Rfl: 0  •  atorvastatin (LIPITOR) 10 mg tablet, Take 1 tablet (10 mg total) by mouth daily, Disp: 90 tablet, Rfl: 0  •  Cholecalciferol 25 MCG (1000 UT) tablet, Take 1,000 Units by mouth daily, Disp: , Rfl:   •  fexofenadine (ALLEGRA) 60 MG tablet, Take 60 mg by mouth daily, Disp: , Rfl:   •  hydrochlorothiazide (HYDRODIURIL) 25 mg tablet, TAKE 1 TABLET(25 MG) BY MOUTH DAILY (Patient taking differently: Take 25 mg by mouth once a week), Disp: 90 tablet, Rfl: 0  •  nadolol (CORGARD) 20 mg tablet, Take 1 tablet (20 mg total) by mouth daily, Disp: 90 tablet, Rfl: 0  •  pantoprazole (PROTONIX) 40 mg tablet, Take 1 tablet (40 mg total) by mouth daily, Disp: 90 tablet, Rfl: 0  •  spironolactone (Aldactone) 100 mg tablet, Take 1 tablet (100 mg total) by mouth 2 (two) times a day, Disp: 180 tablet, Rfl: 0    Current Allergies     Allergies as of 07/01/2023 - Reviewed 07/01/2023   Allergen Reaction Noted   • Amoxicillin-pot clavulanate  05/11/2015   • Cefprozil Diarrhea, Nausea Only, and Vomiting 05/08/2012   • Sulfa antibiotics Rash and Hives 05/08/2012            The following portions of the patient's history were reviewed and updated as appropriate: allergies, current medications, past family history, past medical history, past social history, past surgical history and problem list      Past Medical History:   Diagnosis Date   • Anxiety    • Arrhythmia    • CKD (chronic kidney disease)    • Hyperlipidemia    • Hypertension    • Obesity        Past Surgical History:   Procedure Laterality Date   • BREAST EXCISIONAL BIOPSY Left 1970       Family History   Problem Relation Age of Onset   • Breast cancer Mother 43   • Diabetes Father    • Kidney disease Father    • Breast cancer Maternal Aunt 80   • No Known Problems Sister    • No Known Problems Maternal Aunt    • No Known Problems Maternal Grandmother    • No Known Problems Maternal Grandfather    • No Known Problems Paternal Grandmother    • No Known Problems Paternal Grandfather          Medications have been verified  Objective   Pulse 88   Temp 97 8 °F (36 6 °C)   Resp 18   SpO2 99%   No LMP recorded  Patient is postmenopausal        Physical Exam     Physical Exam  Vitals and nursing note reviewed  Constitutional:       Appearance: She is normal weight  HENT:      Head: Normocephalic  Right Ear: There is impacted cerumen  Left Ear: Tympanic membrane, ear canal and external ear normal       Ears:      Comments: Right TM is red and bulging  Cerumen was removed  Neurological:      General: No focal deficit present  Mental Status: She is alert and oriented to person, place, and time     Psychiatric:         Mood and Affect: Mood normal          Behavior: Behavior normal

## 2023-07-01 NOTE — PATIENT INSTRUCTIONS
Patient was educated on right ear infection  Patient was told to keep right ear clean and dry  Patient was educated on antibiotics  Patient was told to eat on antibiotics  Ear Infection   WHAT YOU NEED TO KNOW:   An ear infection is also called otitis media  Blocked or swollen eustachian tubes can cause an infection  Eustachian tubes connect the middle ear to the back of the nose and throat  They drain fluid from the middle ear  You may have a buildup of fluid in your ear  Germs build up in the fluid and infection develops  DISCHARGE INSTRUCTIONS:   Return to the emergency department if:   You have clear fluid coming from your ear  You have a stiff neck, headache, and a fever  Call your doctor if:   You see blood or pus draining from your ear  Your ear pain gets worse or does not go away, even after treatment  The outside of your ear is red or swollen  You are vomiting or have diarrhea  You have questions or concerns about your condition or care  Medicines: You may  need any of the following:  Acetaminophen  decreases pain and fever  It is available without a doctor's order  Ask how much to take and how often to take it  Follow directions  Read the labels of all other medicines you are using to see if they also contain acetaminophen, or ask your doctor or pharmacist  Acetaminophen can cause liver damage if not taken correctly  NSAIDs , such as ibuprofen, help decrease swelling, pain, and fever  This medicine is available with or without a doctor's order  NSAIDs can cause stomach bleeding or kidney problems in certain people  If you take blood thinner medicine, always ask your healthcare provider if NSAIDs are safe for you  Always read the medicine label and follow directions  Ear drops  may contain medicine to decrease pain and inflammation  Antibiotics  help treat a bacterial infection  Take your medicine as directed    Contact your healthcare provider if you think your medicine is not helping or if you have side effects  Tell your provider if you are allergic to any medicine  Keep a list of the medicines, vitamins, and herbs you take  Include the amounts, and when and why you take them  Bring the list or the pill bottles to follow-up visits  Carry your medicine list with you in case of an emergency  Self-care:   Apply heat  on your ear for 15 to 20 minutes, 3 to 4 times a day or as directed  You can apply heat with an electric heating pad, hot water bottle, or warm compress  Always put a cloth between your skin and the heat pack to prevent burns  Heat helps decrease pain  Apply ice  on your ear for 15 to 20 minutes, 3 to 4 times a day for 2 days or as directed  Use an ice pack, or put crushed ice in a plastic bag  Cover it with a towel before you apply it to your ear  Ice decreases swelling and pain  Prevent an ear infection:   Wash your hands often  to help prevent the spread of germs  Ask everyone in your house to wash their hands with soap and water  Ask them to wash after they use the bathroom or change a diaper  Remind them to wash before they prepare or eat food  Stay away from people who are ill  Some germs spread easily and quickly through contact  Follow up with your doctor as directed:  Write down your questions so you remember to ask them during your visits  © Copyright [employer] 2022 Information is for End User's use only and may not be sold, redistributed or otherwise used for commercial purposes  The above information is an  only  It is not intended as medical advice for individual conditions or treatments  Talk to your doctor, nurse or pharmacist before following any medical regimen to see if it is safe and effective for you

## 2023-09-11 DIAGNOSIS — E78.2 MIXED HYPERLIPIDEMIA: ICD-10-CM

## 2023-09-11 DIAGNOSIS — I10 ESSENTIAL HYPERTENSION: ICD-10-CM

## 2023-09-11 DIAGNOSIS — T18.128A FOOD IMPACTION OF ESOPHAGUS, INITIAL ENCOUNTER: ICD-10-CM

## 2023-09-11 RX ORDER — PANTOPRAZOLE SODIUM 40 MG/1
40 TABLET, DELAYED RELEASE ORAL DAILY
Qty: 90 TABLET | Refills: 0 | Status: SHIPPED | OUTPATIENT
Start: 2023-09-11

## 2023-09-11 RX ORDER — ATORVASTATIN CALCIUM 10 MG/1
10 TABLET, FILM COATED ORAL DAILY
Qty: 90 TABLET | Refills: 0 | Status: SHIPPED | OUTPATIENT
Start: 2023-09-11

## 2023-09-11 RX ORDER — SPIRONOLACTONE 100 MG/1
100 TABLET, FILM COATED ORAL 2 TIMES DAILY
Qty: 180 TABLET | Refills: 0 | Status: SHIPPED | OUTPATIENT
Start: 2023-09-11

## 2023-09-11 RX ORDER — NADOLOL 20 MG/1
20 TABLET ORAL DAILY
Qty: 90 TABLET | Refills: 0 | Status: SHIPPED | OUTPATIENT
Start: 2023-09-11

## 2023-11-16 ENCOUNTER — HOSPITAL ENCOUNTER (OUTPATIENT)
Dept: RADIOLOGY | Age: 68
Discharge: HOME | End: 2023-11-16
Payer: MEDICARE

## 2023-11-16 VITALS — WEIGHT: 258 LBS | HEIGHT: 62 IN | BODY MASS INDEX: 47.48 KG/M2

## 2023-11-16 DIAGNOSIS — Z12.31 ENCOUNTER FOR SCREENING MAMMOGRAM FOR MALIGNANT NEOPLASM OF BREAST: ICD-10-CM

## 2023-11-16 PROCEDURE — 77063 BREAST TOMOSYNTHESIS BI: CPT

## 2023-11-16 PROCEDURE — 77067 SCR MAMMO BI INCL CAD: CPT

## 2023-11-20 ENCOUNTER — HOSPITAL ENCOUNTER (OUTPATIENT)
Dept: MAMMOGRAPHY | Facility: CLINIC | Age: 68
Discharge: HOME | End: 2023-11-20
Payer: MEDICARE

## 2023-11-20 VITALS — WEIGHT: 258 LBS | HEIGHT: 62 IN | BODY MASS INDEX: 47.48 KG/M2

## 2023-11-20 DIAGNOSIS — R92.8 ABNORMAL MAMMOGRAM: ICD-10-CM

## 2023-11-20 PROCEDURE — G0279 TOMOSYNTHESIS, MAMMO: HCPCS

## 2023-11-20 PROCEDURE — 77065 DX MAMMO INCL CAD UNI: CPT

## 2023-11-20 NOTE — PROGRESS NOTES
Met with patient and Dr. Pedersen  regarding recommendation for;      __x2___ RIGHT ______LEFT      _____Ultrasound guided  ___x___Stereotactic  Breast biopsy.      __x___Verbalized understanding.      Blood thinners:  _____yes _____no    Date stopped: ___x________    Biopsy teaching sheet given:  _____x__yes ______no    Medical history reviewed.

## 2023-12-11 DIAGNOSIS — I10 ESSENTIAL HYPERTENSION: ICD-10-CM
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
## 2023-12-11 DIAGNOSIS — E78.2 MIXED HYPERLIPIDEMIA: ICD-10-CM

## 2023-12-11 DIAGNOSIS — T18.128A FOOD IMPACTION OF ESOPHAGUS, INITIAL ENCOUNTER: ICD-10-CM

## 2023-12-11 RX ORDER — SPIRONOLACTONE 100 MG/1
100 TABLET, FILM COATED ORAL 2 TIMES DAILY
Qty: 180 TABLET | Refills: 0 | Status: SHIPPED | OUTPATIENT
Start: 2023-12-11

## 2023-12-11 RX ORDER — NADOLOL 20 MG/1
20 TABLET ORAL DAILY
Qty: 90 TABLET | Refills: 0 | Status: SHIPPED | OUTPATIENT
Start: 2023-12-11

## 2023-12-11 RX ORDER — PANTOPRAZOLE SODIUM 40 MG/1
40 TABLET, DELAYED RELEASE ORAL DAILY
Qty: 90 TABLET | Refills: 0 | Status: SHIPPED | OUTPATIENT
Start: 2023-12-11

## 2023-12-11 RX ORDER — ATORVASTATIN CALCIUM 10 MG/1
10 TABLET, FILM COATED ORAL DAILY
Qty: 90 TABLET | Refills: 0 | Status: SHIPPED | OUTPATIENT
Start: 2023-12-11

## 2023-12-18 ENCOUNTER — OFFICE VISIT (OUTPATIENT)
Dept: CARDIOLOGY CLINIC | Facility: CLINIC | Age: 68
End: 2023-12-18
Payer: MEDICARE

## 2023-12-18 VITALS
HEART RATE: 74 BPM | SYSTOLIC BLOOD PRESSURE: 134 MMHG | WEIGHT: 257 LBS | BODY MASS INDEX: 47.29 KG/M2 | OXYGEN SATURATION: 94 % | DIASTOLIC BLOOD PRESSURE: 82 MMHG | HEIGHT: 62 IN

## 2023-12-18 DIAGNOSIS — I10 ESSENTIAL HYPERTENSION: Primary | ICD-10-CM

## 2023-12-18 DIAGNOSIS — E78.2 MIXED HYPERLIPIDEMIA: ICD-10-CM

## 2023-12-18 DIAGNOSIS — R00.2 PALPITATION: ICD-10-CM

## 2023-12-18 PROCEDURE — 99213 OFFICE O/P EST LOW 20 MIN: CPT | Performed by: INTERNAL MEDICINE

## 2023-12-18 NOTE — PROGRESS NOTES
"Assessment/Plan:    Essential hypertension  Hypertension, stable and adequately controlled.    Palpitation  Symptoms of palpitation appears stable.    Hyperlipidemia  Hyperlipidemia, stable.       Diagnoses and all orders for this visit:    Essential hypertension    Palpitation    Mixed hyperlipidemia          Subjective: Feels well.     Patient ID: Abi Lomax is a 68 y.o. female.    The patient presented to this office for the purpose of cardiac follow-up.  She is known to have a history of hypertension and hyperlipidemia as well as symptoms of palpitations secondary to cardiac arrhythmia.  At this point the patient is feeling rather well.  She denies any symptoms of chest pain or shortness of breath.  She denies any symptoms of palpitation, dizziness or syncope.  She has no leg edema.        The following portions of the patient's history were reviewed and updated as appropriate: allergies, current medications, past family history, past medical history, past social history, past surgical history, and problem list.    Review of Systems   Respiratory:  Negative for apnea, cough, chest tightness, shortness of breath and wheezing.    Cardiovascular:  Negative for chest pain, palpitations and leg swelling.   Gastrointestinal:  Negative for abdominal pain.   Neurological:  Negative for dizziness and light-headedness.   Psychiatric/Behavioral: Negative.           Objective: Stable cardiac wise.      /82 (BP Location: Left arm, Patient Position: Sitting, Cuff Size: Large)   Pulse 74   Ht 5' 2\" (1.575 m)   Wt 117 kg (257 lb)   SpO2 94%   BMI 47.01 kg/m²          Physical Exam  Vitals reviewed.   Constitutional:       General: She is not in acute distress.     Appearance: She is well-developed. She is not diaphoretic.   HENT:      Head: Normocephalic and atraumatic.   Neck:      Thyroid: No thyromegaly.      Vascular: No JVD.   Cardiovascular:      Rate and Rhythm: Normal rate and regular rhythm.      Heart " sounds: S1 normal and S2 normal. No murmur heard.     No friction rub. No gallop.   Pulmonary:      Effort: Pulmonary effort is normal. No respiratory distress.      Breath sounds: No wheezing or rales.   Chest:      Chest wall: No tenderness.   Abdominal:      Palpations: Abdomen is soft.   Musculoskeletal:      Cervical back: Normal range of motion and neck supple.      Right lower leg: No edema.      Left lower leg: No edema.   Skin:     General: Skin is warm and dry.   Neurological:      Mental Status: She is oriented to person, place, and time.   Psychiatric:         Mood and Affect: Mood normal.         Behavior: Behavior normal.

## 2023-12-26 ENCOUNTER — HOSPITAL ENCOUNTER (OUTPATIENT)
Dept: MAMMOGRAPHY | Facility: CLINIC | Age: 68
Discharge: HOME | End: 2023-12-26
Payer: MEDICARE

## 2023-12-26 VITALS — SYSTOLIC BLOOD PRESSURE: 152 MMHG | DIASTOLIC BLOOD PRESSURE: 80 MMHG | HEART RATE: 65 BPM

## 2023-12-26 DIAGNOSIS — R92.8 ABNORMAL MAMMOGRAM: ICD-10-CM

## 2023-12-26 DIAGNOSIS — Z98.890 STATUS POST BIOPSY: ICD-10-CM

## 2023-12-26 PROCEDURE — 88305 TISSUE EXAM BY PATHOLOGIST: CPT | Performed by: PATHOLOGY

## 2023-12-26 PROCEDURE — 19082 BX BREAST ADD LESION STRTCTC: CPT

## 2023-12-26 PROCEDURE — A4648 IMPLANTABLE TISSUE MARKER: HCPCS

## 2023-12-26 PROCEDURE — 88341 IMHCHEM/IMCYTCHM EA ADD ANTB: CPT | Performed by: PATHOLOGY

## 2023-12-26 PROCEDURE — 88342 IMHCHEM/IMCYTCHM 1ST ANTB: CPT | Performed by: PATHOLOGY

## 2023-12-26 PROCEDURE — 19081 BX BREAST 1ST LESION STRTCTC: CPT

## 2023-12-26 RX ORDER — LIDOCAINE HYDROCHLORIDE AND EPINEPHRINE BITARTRATE 20; .01 MG/ML; MG/ML
10 INJECTION, SOLUTION SUBCUTANEOUS ONCE
Status: COMPLETED | OUTPATIENT
Start: 2023-12-26 | End: 2023-12-26

## 2023-12-26 RX ORDER — LIDOCAINE HYDROCHLORIDE 10 MG/ML
5 INJECTION, SOLUTION EPIDURAL; INFILTRATION; INTRACAUDAL; PERINEURAL ONCE
Status: COMPLETED | OUTPATIENT
Start: 2023-12-26 | End: 2023-12-26

## 2023-12-26 RX ADMIN — LIDOCAINE HYDROCHLORIDE AND EPINEPHRINE 10 ML: 20; 10 INJECTION, SOLUTION INFILTRATION; PERINEURAL at 13:46

## 2023-12-26 RX ADMIN — LIDOCAINE HYDROCHLORIDE 5 ML: 10 INJECTION, SOLUTION EPIDURAL; INFILTRATION; INTRACAUDAL; PERINEURAL at 13:46

## 2023-12-26 RX ADMIN — LIDOCAINE HYDROCHLORIDE AND EPINEPHRINE 10 ML: 20; 10 INJECTION, SOLUTION INFILTRATION; PERINEURAL at 13:00

## 2023-12-26 RX ADMIN — LIDOCAINE HYDROCHLORIDE 5 ML: 10 INJECTION, SOLUTION EPIDURAL; INFILTRATION; INTRACAUDAL; PERINEURAL at 13:00

## 2023-12-26 NOTE — PROGRESS NOTES
Procedure type: Site 1     _____ultrasound guided __x___stereotactic    Breast:    _____Left __x___Right    Location: 12 o'clock    Needle: 8G Revolve    # of passes: 3 cores ( 2 cores with calcs and 1 core without calcs)    Clip: Mammomark U shape      Procedure type: Site 2    _____ultrasound guided ____x_stereotactic    Breast:    _____Left ___x__Right    Location:10 o'clock    Needle: 8G Revolve    # of passes: 3 cores (  1 core with calcs and 2 core without calcs)    Clip: Mammomarchrista Andrews      Performed by:Dr. Callaway    Pressure held for 5 minutes by:Magalis Al Strips:    ___X__yes _____no    Band aid:    __X___yes_____no    Tolerated procedure:    __X___yes _____no

## 2023-12-26 NOTE — PROGRESS NOTES
Patient arrived via:    __X___ambulatory    _____wheelchair    _____stretcher      Domestic violence screen    ___X___negative______positive    Breast Implants:    _______yes ____X____no

## 2023-12-26 NOTE — PROGRESS NOTES
Ice pack given:    __X___yes _____no    Discharge instructions reviewed and given to patient:    __X___yes _____no    Discharged via:    __X___amulatory    _____wheelchair    _____stretcher    Stable on discharge:    __X___yes ____no

## 2023-12-27 NOTE — PROGRESS NOTES
Post procedure call completed    Bleeding: _____yes __X___no (Pt denies)    Pain: _____yes ___X___no (Pt reports some mild discomfort, took Tylenol last night)    Redness/Swelling: ______yes ___X___no (Pt denies)    Band aid removed: _____yes ___X__no (discussed removing when she showers)    Steri-Strips intact: ___X___yes _____no (discussed with patient to remove steri strips on Sunday if they have not come off on their own)    Pt with no questions at this time, adv will call when results available, adv to call with any questions or concerns, has name/# for contact

## 2023-12-29 PROCEDURE — 88341 IMHCHEM/IMCYTCHM EA ADD ANTB: CPT | Performed by: PATHOLOGY

## 2023-12-29 PROCEDURE — 88342 IMHCHEM/IMCYTCHM 1ST ANTB: CPT | Performed by: PATHOLOGY

## 2023-12-29 PROCEDURE — 88305 TISSUE EXAM BY PATHOLOGIST: CPT | Performed by: PATHOLOGY

## 2024-01-02 ENCOUNTER — TELEPHONE (OUTPATIENT)
Dept: MAMMOGRAPHY | Facility: CLINIC | Age: 69
End: 2024-01-02

## 2024-01-02 NOTE — TELEPHONE ENCOUNTER
[facility]    Diagnosis:Atypical ductal hyperplasia, Atypical lobular hyperplasia    Is this diagnosis cancer (Y/N):no  (Nurses: If yes, this should be sent to [facility])    Biopsy Date: 12/26/2023    Does the patient have another biopsy pending:  If so, when: no    Preferred provider:Dr. Mendoza    Preferred location:[place]    Any requests for dates/times:     Any additional information:     Please advise when appointment is made yes

## 2024-01-02 NOTE — TELEPHONE ENCOUNTER
I just wanted to inform you that the above patient was given her high risk breast biopsy results today. The patient was referred to a breast surgeon, Dr. Matias Mendoza.  [facility] notified and will reach out with appointment.    If you have any questions or if I can be of any further assistance please let me know.  Thank you,  Genny Alvarado, JOSIN,RN  Breast Nurse Navigator

## 2024-01-10 ENCOUNTER — TELEPHONE (OUTPATIENT)
Dept: SURGICAL ONCOLOGY | Facility: CLINIC | Age: 69
End: 2024-01-10

## 2024-01-10 PROBLEM — N60.91 ATYPICAL DUCTAL HYPERPLASIA OF RIGHT BREAST: Status: ACTIVE | Noted: 2024-01-10

## 2024-01-10 NOTE — TELEPHONE ENCOUNTER
The patient returned the phone call. She accepted the consult offered tomorrow at 1:00 and verified appointment details. She will arrive by 12:45 to complete her new patient paperwork. The patient was appreciative of the sooner appointment.

## 2024-01-10 NOTE — TELEPHONE ENCOUNTER
Called the patient to offer her a sooner consult with Dr. Mendoza tomorrow but there was no answer. A message was left with a direct call back number provided.

## 2024-01-11 ENCOUNTER — CONSULT (OUTPATIENT)
Dept: SURGICAL ONCOLOGY | Facility: CLINIC | Age: 69
End: 2024-01-11
Payer: MEDICARE

## 2024-01-11 VITALS
DIASTOLIC BLOOD PRESSURE: 88 MMHG | BODY MASS INDEX: 48.12 KG/M2 | OXYGEN SATURATION: 97 % | HEART RATE: 78 BPM | SYSTOLIC BLOOD PRESSURE: 142 MMHG | TEMPERATURE: 97.6 F | RESPIRATION RATE: 16 BRPM | WEIGHT: 261.5 LBS | HEIGHT: 62 IN

## 2024-01-11 DIAGNOSIS — Z01.818 PREOP TESTING: ICD-10-CM

## 2024-01-11 DIAGNOSIS — N60.91 ATYPICAL DUCTAL HYPERPLASIA OF RIGHT BREAST: Primary | ICD-10-CM

## 2024-01-11 DIAGNOSIS — Z80.3 FAMILY HISTORY OF BREAST CANCER: ICD-10-CM

## 2024-01-11 PROCEDURE — 99205 OFFICE O/P NEW HI 60 MIN: CPT | Performed by: SURGERY

## 2024-01-11 RX ORDER — OXYCODONE HYDROCHLORIDE AND ACETAMINOPHEN 5; 325 MG/1; MG/1
1 TABLET ORAL EVERY 6 HOURS PRN
Qty: 6 TABLET | Refills: 0 | Status: SHIPPED | OUTPATIENT
Start: 2024-01-11

## 2024-01-11 NOTE — H&P (VIEW-ONLY)
Surgical Oncology Consult Note       [facility] SURGICAL ONCOLOGY RACHELL  [address] [facility]  [place] [address]    Abi Lomax  1955  [id number]  [address] [facility] SURGICAL ONCOLOGY RACHELL  [address] [facility]  [facility] [address]      Chief Complaint:     Chief Complaint   Patient presents with    Consult       Assessment and Plan:   Assessment/Plan   Patient presents with a new diagnosis of right breast atypia both a atypical ductal as well as atypical lobular hyperplasia with pleomorphic features.  This was at the 12 o'clock position retroareolar with a U-shaped clip.  She also had a benign biopsy at the 10 o'clock position with a Tri Bell clip.  Recommend she have a Lilly clip placed for the atypia and then undergo a LILLY directed lumpectomy to exclude a malignancy.  She is in agreement with this approach.  Her mother passed away from breast cancer and she would like to be proactive.    Oncology History:     Oncology History    No history exists.       History of Present Illness:   Patient went for screening mammogram which showed no abnormalities on the left side however on the right side there were 3 areas of calcifications 1 of which were stable the other 2 were ultimately biopsied stereotactically.  At the 10:00 area was benign however the retroareolar region with a U-shaped clip was atypical lobular as well as atypical ductal hyperplasia.  Patient presents now for an opinion regarding further management.  Her family history is remarkable for her mother having breast cancer at the age of 42 and a maternal aunt with breast cancer the age of 87.    Review of Systems:   Review of Systems   Constitutional:  Negative for activity change, appetite change and fatigue.   HENT: Negative.     Eyes: Negative.    Respiratory:  Negative for cough, shortness of breath and wheezing.    Cardiovascular:  Negative for chest pain  and leg swelling.   Gastrointestinal: Negative.    Endocrine: Negative.    Genitourinary: Negative.    Musculoskeletal:         No new changes or complaints of bone pain   Skin: Negative.    Allergic/Immunologic: Negative.    Neurological: Negative.    Hematological: Negative.    Psychiatric/Behavioral: Negative.         Past Medical History:      Patient Active Problem List   Diagnosis    Palpitation    Hyperlipidemia    Essential hypertension    Food impaction of esophagus    Esophageal dysphagia    Colon cancer screening    Morbid obesity (HCC)    Atypical ductal hyperplasia of right breast        Past Medical History:   Diagnosis Date    Anxiety     Arrhythmia     CKD (chronic kidney disease)     Hyperlipidemia     Hypertension     Obesity         Past Surgical History:   Procedure Laterality Date    BREAST EXCISIONAL BIOPSY Left 1970    MAMMO STEREOTACTIC BREAST BIOPSY RIGHT (ALL INC) Right 12/26/2023    MAMMO STEREOTACTIC BREAST BIOPSY RIGHT (ALL INC) EACH ADD Right 12/26/2023    UPPER GASTROINTESTINAL ENDOSCOPY  3/10/2020        Family History   Problem Relation Age of Onset    Breast cancer Mother 42    Diabetes Father     Kidney disease Father     No Known Problems Sister     Breast cancer Maternal Aunt 87    No Known Problems Maternal Aunt     No Known Problems Maternal Grandmother     No Known Problems Maternal Grandfather     No Known Problems Paternal Grandmother     No Known Problems Paternal Grandfather     Colon cancer Neg Hx     Endometrial cancer Neg Hx     Ovarian cancer Neg Hx         Social History     Socioeconomic History    Marital status: Single     Spouse name: Not on file    Number of children: Not on file    Years of education: Not on file    Highest education level: Not on file   Occupational History    Not on file   Tobacco Use    Smoking status: Never    Smokeless tobacco: Never   Vaping Use    Vaping status: Never Used   Substance and Sexual Activity    Alcohol use: Never     Comment:  pt reports maybe 3 drinks a year    Drug use: Never    Sexual activity: Never   Other Topics Concern    Not on file   Social History Narrative    Not on file     Social Determinants of Health     Financial Resource Strain: Low Risk  (9/15/2023)    Received from [facility]    Overall Financial Resource Strain (CARDIA)     Difficulty of Paying Living Expenses: Not hard at all   Food Insecurity: No Food Insecurity (9/15/2023)    Received from [facility]    Hunger Vital Sign     Worried About Running Out of Food in the Last Year: Never true     Ran Out of Food in the Last Year: Never true   Transportation Needs: No Transportation Needs (9/15/2023)    Received from [facility]    PRAPARE - Transportation     Lack of Transportation (Medical): No     Lack of Transportation (Non-Medical): No   Physical Activity: Sufficiently Active (9/15/2023)    Received from [facility]    Exercise Vital Sign     Days of Exercise per Week: 4 days     Minutes of Exercise per Session: 40 min   Stress: No Stress Concern Present (9/15/2023)    Received from [facility]    [place] [employer] - Occupational Stress Questionnaire     Feeling of Stress : Not at all   Social Connections: Moderately Integrated (9/15/2023)    Received from [facility]    Social Connection and Isolation Panel [NHANES]     Frequency of Communication with Friends and Family: Twice a week     Frequency of Social Gatherings with Friends and Family: Three times a week     Attends Latter day Services: 1 to 4 times per year     Active Member of Clubs or Organizations: Yes     Attends Club or Organization Meetings: More than 4 times per year     Marital Status: Never    Intimate Partner Violence: Not At Risk (9/15/2023)    Received from [facility]    Humiliation, Afraid, Rape, and Kick questionnaire     Fear of Current or  Ex-Partner: No     Emotionally Abused: No     Physically Abused: No     Sexually Abused: No   Housing Stability: Low Risk  (9/15/2023)    Received from [facility]    Housing Stability Vital Sign     Unable to Pay for Housing in the Last Year: No     Number of Places Lived in the Last Year: 1     Unstable Housing in the Last Year: No        Current Outpatient Medications:     atorvastatin (LIPITOR) 10 mg tablet, Take 1 tablet (10 mg total) by mouth daily, Disp: 90 tablet, Rfl: 0    Cholecalciferol 25 MCG (1000 UT) tablet, Take 1,000 Units by mouth daily, Disp: , Rfl:     fexofenadine (ALLEGRA) 60 MG tablet, Take 60 mg by mouth daily, Disp: , Rfl:     hydrochlorothiazide (HYDRODIURIL) 25 mg tablet, TAKE 1 TABLET(25 MG) BY MOUTH DAILY (Patient taking differently: Take 25 mg by mouth once a week), Disp: 90 tablet, Rfl: 0    nadolol (CORGARD) 20 mg tablet, Take 1 tablet (20 mg total) by mouth daily, Disp: 90 tablet, Rfl: 0    pantoprazole (PROTONIX) 40 mg tablet, Take 1 tablet (40 mg total) by mouth daily, Disp: 90 tablet, Rfl: 0    spironolactone (Aldactone) 100 mg tablet, Take 1 tablet (100 mg total) by mouth 2 (two) times a day, Disp: 180 tablet, Rfl: 0     Allergies   Allergen Reactions    Amoxicillin-Pot Clavulanate     Cefprozil Diarrhea, Nausea Only and Vomiting     Category: Allergy;     Sulfa Antibiotics Rash and Hives     Category: Allergy;   Category: Allergy;        Physical Exam:     Vitals:    01/11/24 1256   BP: 142/88   Pulse: 78   Resp: 16   Temp: 97.6 °F (36.4 °C)   SpO2: 97%     Physical Exam  Vitals reviewed.   Constitutional:       Appearance: She is well-developed.   HENT:      Head: Normocephalic and atraumatic.   Eyes:      Pupils: Pupils are equal, round, and reactive to light.   Neck:      Thyroid: No thyromegaly.      Vascular: No JVD.      Trachea: No tracheal deviation.   Cardiovascular:      Rate and Rhythm: Normal rate and regular rhythm.      Heart sounds: Normal heart  sounds. No murmur heard.     No friction rub. No gallop.   Pulmonary:      Effort: Pulmonary effort is normal. No respiratory distress.      Breath sounds: Normal breath sounds. No wheezing or rales.   Chest:      Comments: Both breasts were examined in the sitting and supine position. There are no worrisome skin lesions, no nipple retraction and no nipple discharge. There are no dominant masses, axillary adenopathy or supraclavicular adenopathy on either side.  Abdominal:      General: There is no distension.      Palpations: Abdomen is soft. There is no hepatomegaly or mass.      Tenderness: There is no abdominal tenderness. There is no guarding or rebound.   Musculoskeletal:         General: No tenderness. Normal range of motion.      Cervical back: Normal range of motion and neck supple.   Lymphadenopathy:      Cervical: No cervical adenopathy.   Skin:     General: Skin is warm and dry.      Findings: No erythema or rash.   Neurological:      Mental Status: She is alert and oriented to person, place, and time.      Cranial Nerves: No cranial nerve deficit.   Psychiatric:         Behavior: Behavior normal.         Results:   I reviewed her mammogram.  U-shape clip is the site of atypia.  I recommend a Hortencia  be placed at this location and then we remove that area.  The pathology is concordant with the imaging.    Discussion/Summary:   I recommended a Hortencia  directed lumpectomy of the right breast.  The patient is in agreement.  I described the procedure.  She has had a left breast biopsy by Dr. Sheppard in the distant past which was done by palpation.  I reviewed the having  directed lumpectomy and the reason for having the Hortencia clip placed preoperatively.  I explained that having atypia does increase ones risk and that we would estimate her risk once we knew the definitively that there was no component of cancer at the present time.  We also discussed possible genetic testing postoperatively.  Her  and her family history.    The patient and I underwent the process of consent for right breast Hortencia  directed lumpectomy.  The complications outlined on the consent including relatively minor problems (wound infection, wound healing problems, hematomas, scarring, chronic discomfort/pain), moderate problems (injury to nerves or blood vessels, allergic reactions) and major complications (extensive blood loss requiring transfusions or possible addtional surgeries, cardiac arrest, stroke and possible death).  We also reviewed specific complications as outlined on the consent form including possible need for additional treatments.  All questions were answered to the patient's satisfaction.  We will coordinate the surgery at our next mutual convience.    Advance Care Planning/Advance Directives:  I discussed the disease status, treatment plans and follow-up with the patient.

## 2024-01-11 NOTE — PROGRESS NOTES
Surgical Oncology Consult Note       [facility] SURGICAL ONCOLOGY RACHELL  [facility]  [place] [address]    Abi Lomax  1955  [id number]  [facility] SURGICAL ONCOLOGY RACHELL  [address] [facility]  [facility] [address]      Chief Complaint:     Chief Complaint   Patient presents with    Consult       Assessment and Plan:   Assessment/Plan   Patient presents with a new diagnosis of right breast atypia both a atypical ductal as well as atypical lobular hyperplasia with pleomorphic features.  This was at the 12 o'clock position retroareolar with a U-shaped clip.  She also had a benign biopsy at the 10 o'clock position with a Tri Bell clip.  Recommend she have a Lilly clip placed for the atypia and then undergo a LILLY directed lumpectomy to exclude a malignancy.  She is in agreement with this approach.  Her mother passed away from breast cancer and she would like to be proactive.    Oncology History:     Oncology History    No history exists.       History of Present Illness:   Patient went for screening mammogram which showed no abnormalities on the left side however on the right side there were 3 areas of calcifications 1 of which were stable the other 2 were ultimately biopsied stereotactically.  At the 10:00 area was benign however the retroareolar region with a U-shaped clip was atypical lobular as well as atypical ductal hyperplasia.  Patient presents now for an opinion regarding further management.  Her family history is remarkable for her mother having breast cancer at the age of 42 and a maternal aunt with breast cancer the age of 87.    Review of Systems:   Review of Systems   Constitutional:  Negative for activity change, appetite change and fatigue.   HENT: Negative.     Eyes: Negative.    Respiratory:  Negative for cough, shortness of breath and wheezing.    Cardiovascular:  Negative for chest pain  and leg swelling.   Gastrointestinal: Negative.    Endocrine: Negative.    Genitourinary: Negative.    Musculoskeletal:         No new changes or complaints of bone pain   Skin: Negative.    Allergic/Immunologic: Negative.    Neurological: Negative.    Hematological: Negative.    Psychiatric/Behavioral: Negative.         Past Medical History:      Patient Active Problem List   Diagnosis    Palpitation    Hyperlipidemia    Essential hypertension    Food impaction of esophagus    Esophageal dysphagia    Colon cancer screening    Morbid obesity (HCC)    Atypical ductal hyperplasia of right breast        Past Medical History:   Diagnosis Date    Anxiety     Arrhythmia     CKD (chronic kidney disease)     Hyperlipidemia     Hypertension     Obesity         Past Surgical History:   Procedure Laterality Date    BREAST EXCISIONAL BIOPSY Left 1970    MAMMO STEREOTACTIC BREAST BIOPSY RIGHT (ALL INC) Right 12/26/2023    MAMMO STEREOTACTIC BREAST BIOPSY RIGHT (ALL INC) EACH ADD Right 12/26/2023    UPPER GASTROINTESTINAL ENDOSCOPY  3/10/2020        Family History   Problem Relation Age of Onset    Breast cancer Mother 42    Diabetes Father     Kidney disease Father     No Known Problems Sister     Breast cancer Maternal Aunt 87    No Known Problems Maternal Aunt     No Known Problems Maternal Grandmother     No Known Problems Maternal Grandfather     No Known Problems Paternal Grandmother     No Known Problems Paternal Grandfather     Colon cancer Neg Hx     Endometrial cancer Neg Hx     Ovarian cancer Neg Hx         Social History     Socioeconomic History    Marital status: Single     Spouse name: Not on file    Number of children: Not on file    Years of education: Not on file    Highest education level: Not on file   Occupational History    Not on file   Tobacco Use    Smoking status: Never    Smokeless tobacco: Never   Vaping Use    Vaping status: Never Used   Substance and Sexual Activity    Alcohol use: Never     Comment:  pt reports maybe 3 drinks a year    Drug use: Never    Sexual activity: Never   Other Topics Concern    Not on file   Social History Narrative    Not on file     Social Determinants of Health     Financial Resource Strain: Low Risk  (9/15/2023)    Received from [facility]    Overall Financial Resource Strain (CARDIA)     Difficulty of Paying Living Expenses: Not hard at all   Food Insecurity: No Food Insecurity (9/15/2023)    Received from [facility]    Hunger Vital Sign     Worried About Running Out of Food in the Last Year: Never true     Ran Out of Food in the Last Year: Never true   Transportation Needs: No Transportation Needs (9/15/2023)    Received from [facility]    PRAPARE - Transportation     Lack of Transportation (Medical): No     Lack of Transportation (Non-Medical): No   Physical Activity: Sufficiently Active (9/15/2023)    Received from [facility]    Exercise Vital Sign     Days of Exercise per Week: 4 days     Minutes of Exercise per Session: 40 min   Stress: No Stress Concern Present (9/15/2023)    Received from [facility]    [place] [employer] - Occupational Stress Questionnaire     Feeling of Stress : Not at all   Social Connections: Moderately Integrated (9/15/2023)    Received from [facility]    Social Connection and Isolation Panel [NHANES]     Frequency of Communication with Friends and Family: Twice a week     Frequency of Social Gatherings with Friends and Family: Three times a week     Attends Hindu Services: 1 to 4 times per year     Active Member of Clubs or Organizations: Yes     Attends Club or Organization Meetings: More than 4 times per year     Marital Status: Never    Intimate Partner Violence: Not At Risk (9/15/2023)    Received from [facility]    Humiliation, Afraid, Rape, and Kick questionnaire     Fear of Current or  Ex-Partner: No     Emotionally Abused: No     Physically Abused: No     Sexually Abused: No   Housing Stability: Low Risk  (9/15/2023)    Received from [facility]    Housing Stability Vital Sign     Unable to Pay for Housing in the Last Year: No     Number of Places Lived in the Last Year: 1     Unstable Housing in the Last Year: No        Current Outpatient Medications:     atorvastatin (LIPITOR) 10 mg tablet, Take 1 tablet (10 mg total) by mouth daily, Disp: 90 tablet, Rfl: 0    Cholecalciferol 25 MCG (1000 UT) tablet, Take 1,000 Units by mouth daily, Disp: , Rfl:     fexofenadine (ALLEGRA) 60 MG tablet, Take 60 mg by mouth daily, Disp: , Rfl:     hydrochlorothiazide (HYDRODIURIL) 25 mg tablet, TAKE 1 TABLET(25 MG) BY MOUTH DAILY (Patient taking differently: Take 25 mg by mouth once a week), Disp: 90 tablet, Rfl: 0    nadolol (CORGARD) 20 mg tablet, Take 1 tablet (20 mg total) by mouth daily, Disp: 90 tablet, Rfl: 0    pantoprazole (PROTONIX) 40 mg tablet, Take 1 tablet (40 mg total) by mouth daily, Disp: 90 tablet, Rfl: 0    spironolactone (Aldactone) 100 mg tablet, Take 1 tablet (100 mg total) by mouth 2 (two) times a day, Disp: 180 tablet, Rfl: 0     Allergies   Allergen Reactions    Amoxicillin-Pot Clavulanate     Cefprozil Diarrhea, Nausea Only and Vomiting     Category: Allergy;     Sulfa Antibiotics Rash and Hives     Category: Allergy;   Category: Allergy;        Physical Exam:     Vitals:    01/11/24 1256   BP: 142/88   Pulse: 78   Resp: 16   Temp: 97.6 °F (36.4 °C)   SpO2: 97%     Physical Exam  Vitals reviewed.   Constitutional:       Appearance: She is well-developed.   HENT:      Head: Normocephalic and atraumatic.   Eyes:      Pupils: Pupils are equal, round, and reactive to light.   Neck:      Thyroid: No thyromegaly.      Vascular: No JVD.      Trachea: No tracheal deviation.   Cardiovascular:      Rate and Rhythm: Normal rate and regular rhythm.      Heart sounds: Normal heart  sounds. No murmur heard.     No friction rub. No gallop.   Pulmonary:      Effort: Pulmonary effort is normal. No respiratory distress.      Breath sounds: Normal breath sounds. No wheezing or rales.   Chest:      Comments: Both breasts were examined in the sitting and supine position. There are no worrisome skin lesions, no nipple retraction and no nipple discharge. There are no dominant masses, axillary adenopathy or supraclavicular adenopathy on either side.  Abdominal:      General: There is no distension.      Palpations: Abdomen is soft. There is no hepatomegaly or mass.      Tenderness: There is no abdominal tenderness. There is no guarding or rebound.   Musculoskeletal:         General: No tenderness. Normal range of motion.      Cervical back: Normal range of motion and neck supple.   Lymphadenopathy:      Cervical: No cervical adenopathy.   Skin:     General: Skin is warm and dry.      Findings: No erythema or rash.   Neurological:      Mental Status: She is alert and oriented to person, place, and time.      Cranial Nerves: No cranial nerve deficit.   Psychiatric:         Behavior: Behavior normal.         Results:   I reviewed her mammogram.  U-shape clip is the site of atypia.  I recommend a Hortencia  be placed at this location and then we remove that area.  The pathology is concordant with the imaging.    Discussion/Summary:   I recommended a Hortencia  directed lumpectomy of the right breast.  The patient is in agreement.  I described the procedure.  She has had a left breast biopsy by Dr. Sheppard in the distant past which was done by palpation.  I reviewed the having  directed lumpectomy and the reason for having the Hortencia clip placed preoperatively.  I explained that having atypia does increase ones risk and that we would estimate her risk once we knew the definitively that there was no component of cancer at the present time.  We also discussed possible genetic testing postoperatively.  Her  and her family history.    The patient and I underwent the process of consent for right breast Hortencia  directed lumpectomy.  The complications outlined on the consent including relatively minor problems (wound infection, wound healing problems, hematomas, scarring, chronic discomfort/pain), moderate problems (injury to nerves or blood vessels, allergic reactions) and major complications (extensive blood loss requiring transfusions or possible addtional surgeries, cardiac arrest, stroke and possible death).  We also reviewed specific complications as outlined on the consent form including possible need for additional treatments.  All questions were answered to the patient's satisfaction.  We will coordinate the surgery at our next mutual convience.    Advance Care Planning/Advance Directives:  I discussed the disease status, treatment plans and follow-up with the patient.

## 2024-01-12 ENCOUNTER — APPOINTMENT (OUTPATIENT)
Dept: LAB | Facility: CLINIC | Age: 69
End: 2024-01-12
Payer: MEDICARE

## 2024-01-12 ENCOUNTER — OFFICE VISIT (OUTPATIENT)
Dept: LAB | Facility: CLINIC | Age: 69
End: 2024-01-12
Payer: MEDICARE

## 2024-01-12 DIAGNOSIS — Z01.818 PREOP TESTING: ICD-10-CM

## 2024-01-12 DIAGNOSIS — N60.91 ATYPICAL DUCTAL HYPERPLASIA OF RIGHT BREAST: ICD-10-CM

## 2024-01-12 LAB
ALBUMIN SERPL BCP-MCNC: 4.1 G/DL (ref 3.5–5)
ALP SERPL-CCNC: 55 U/L (ref 34–104)
ALT SERPL W P-5'-P-CCNC: 11 U/L (ref 7–52)
ANION GAP SERPL CALCULATED.3IONS-SCNC: 7 MMOL/L
AST SERPL W P-5'-P-CCNC: 14 U/L (ref 13–39)
ATRIAL RATE: 57 BPM
BASOPHILS # BLD AUTO: 0.03 THOUSANDS/ÂΜL (ref 0–0.1)
BASOPHILS NFR BLD AUTO: 1 % (ref 0–1)
BILIRUB SERPL-MCNC: 0.64 MG/DL (ref 0.2–1)
BUN SERPL-MCNC: 19 MG/DL (ref 5–25)
CALCIUM SERPL-MCNC: 9.5 MG/DL (ref 8.4–10.2)
CHLORIDE SERPL-SCNC: 101 MMOL/L (ref 96–108)
CO2 SERPL-SCNC: 28 MMOL/L (ref 21–32)
CREAT SERPL-MCNC: 0.91 MG/DL (ref 0.6–1.3)
EOSINOPHIL # BLD AUTO: 0 THOUSAND/ÂΜL (ref 0–0.61)
EOSINOPHIL NFR BLD AUTO: 0 % (ref 0–6)
ERYTHROCYTE [DISTWIDTH] IN BLOOD BY AUTOMATED COUNT: 13.5 % (ref 11.6–15.1)
GFR SERPL CREATININE-BSD FRML MDRD: 64 ML/MIN/1.73SQ M
GLUCOSE P FAST SERPL-MCNC: 116 MG/DL (ref 65–99)
HCT VFR BLD AUTO: 45.7 % (ref 34.8–46.1)
HGB BLD-MCNC: 14.4 G/DL (ref 11.5–15.4)
IMM GRANULOCYTES # BLD AUTO: 0.01 THOUSAND/UL (ref 0–0.2)
IMM GRANULOCYTES NFR BLD AUTO: 0 % (ref 0–2)
LYMPHOCYTES # BLD AUTO: 1.82 THOUSANDS/ÂΜL (ref 0.6–4.47)
LYMPHOCYTES NFR BLD AUTO: 32 % (ref 14–44)
MCH RBC QN AUTO: 28.2 PG (ref 26.8–34.3)
MCHC RBC AUTO-ENTMCNC: 31.5 G/DL (ref 31.4–37.4)
MCV RBC AUTO: 89 FL (ref 82–98)
MONOCYTES # BLD AUTO: 0.7 THOUSAND/ÂΜL (ref 0.17–1.22)
MONOCYTES NFR BLD AUTO: 12 % (ref 4–12)
NEUTROPHILS # BLD AUTO: 3.17 THOUSANDS/ÂΜL (ref 1.85–7.62)
NEUTS SEG NFR BLD AUTO: 55 % (ref 43–75)
NRBC BLD AUTO-RTO: 0 /100 WBCS
P AXIS: 63 DEGREES
PLATELET # BLD AUTO: 235 THOUSANDS/UL (ref 149–390)
PMV BLD AUTO: 10.9 FL (ref 8.9–12.7)
POTASSIUM SERPL-SCNC: 4.2 MMOL/L (ref 3.5–5.3)
PR INTERVAL: 148 MS
PROT SERPL-MCNC: 6.9 G/DL (ref 6.4–8.4)
QRS AXIS: 54 DEGREES
QRSD INTERVAL: 76 MS
QT INTERVAL: 444 MS
QTC INTERVAL: 432 MS
RBC # BLD AUTO: 5.11 MILLION/UL (ref 3.81–5.12)
SODIUM SERPL-SCNC: 136 MMOL/L (ref 135–147)
T WAVE AXIS: 28 DEGREES
VENTRICULAR RATE: 57 BPM
WBC # BLD AUTO: 5.73 THOUSAND/UL (ref 4.31–10.16)

## 2024-01-12 PROCEDURE — 80053 COMPREHEN METABOLIC PANEL: CPT

## 2024-01-12 PROCEDURE — 36415 COLL VENOUS BLD VENIPUNCTURE: CPT

## 2024-01-12 PROCEDURE — 85025 COMPLETE CBC W/AUTO DIFF WBC: CPT

## 2024-01-12 PROCEDURE — 93005 ELECTROCARDIOGRAM TRACING: CPT

## 2024-01-12 NOTE — PROGRESS NOTES
Call placed to patient regarding recommendation for;    __X___ RIGHT ______LEFT      __X___SAVI  placement.    Procedure explained to patient, additional questions answered at this time    __X___Verbalized understanding.      Blood thinners:  _____yes __X___no    _    All teaching points discussed during call, pt with no questions at this time.    Pt given name/# for any further questions/needs

## 2024-01-26 ENCOUNTER — HOSPITAL ENCOUNTER (OUTPATIENT)
Dept: MAMMOGRAPHY | Facility: CLINIC | Age: 69
Discharge: HOME | End: 2024-01-26
Payer: MEDICARE

## 2024-01-26 ENCOUNTER — HOSPITAL ENCOUNTER (OUTPATIENT)
Dept: MAMMOGRAPHY | Facility: CLINIC | Age: 69
Discharge: HOME | End: 2024-01-26

## 2024-01-26 ENCOUNTER — HOSPITAL ENCOUNTER (OUTPATIENT)
Dept: ULTRASOUND IMAGING | Facility: CLINIC | Age: 69
Discharge: HOME | End: 2024-01-26
Payer: MEDICARE

## 2024-01-26 VITALS — SYSTOLIC BLOOD PRESSURE: 130 MMHG | DIASTOLIC BLOOD PRESSURE: 80 MMHG | HEART RATE: 75 BPM

## 2024-01-26 VITALS — DIASTOLIC BLOOD PRESSURE: 74 MMHG | HEART RATE: 73 BPM | SYSTOLIC BLOOD PRESSURE: 138 MMHG

## 2024-01-26 DIAGNOSIS — N60.91 ATYPICAL DUCTAL HYPERPLASIA OF RIGHT BREAST: ICD-10-CM

## 2024-01-26 DIAGNOSIS — R92.8 ABNORMAL MAMMOGRAM: ICD-10-CM

## 2024-01-26 PROCEDURE — 19285 PERQ DEV BREAST 1ST US IMAG: CPT

## 2024-01-26 RX ORDER — LIDOCAINE HYDROCHLORIDE 10 MG/ML
5 INJECTION, SOLUTION EPIDURAL; INFILTRATION; INTRACAUDAL; PERINEURAL ONCE
Status: COMPLETED | OUTPATIENT
Start: 2024-01-26 | End: 2024-01-26

## 2024-01-26 RX ORDER — LIDOCAINE HYDROCHLORIDE 10 MG/ML
5 INJECTION, SOLUTION EPIDURAL; INFILTRATION; INTRACAUDAL; PERINEURAL ONCE
Status: DISCONTINUED | OUTPATIENT
Start: 2024-01-26 | End: 2024-01-27 | Stop reason: HOSPADM

## 2024-01-26 RX ADMIN — LIDOCAINE HYDROCHLORIDE 5 ML: 10 INJECTION, SOLUTION EPIDURAL; INFILTRATION; INTRACAUDAL; PERINEURAL at 13:58

## 2024-01-26 NOTE — PROGRESS NOTES
Ice pack given:    __X___yes _____no    Discharge instructions reviewed and given to patient:    __X___yes _____no    Discharged via:    __X___amulatory    _____wheelchair    _____stretcher    Biopsy site clean and dry with no bleeding on discharge:    __X___yes ____no

## 2024-01-26 NOTE — PRE-PROCEDURE INSTRUCTIONS
Pre-Surgery Instructions:   Medication Instructions    atorvastatin (LIPITOR) 10 mg tablet Take day of surgery.    Cholecalciferol 25 MCG (1000 UT) tablet Stop taking 7 days prior to surgery.    fexofenadine (ALLEGRA) 60 MG tablet Uses PRN- OK to take day of surgery    hydrochlorothiazide (HYDRODIURIL) 25 mg tablet Hold day of surgery.    nadolol (CORGARD) 20 mg tablet Take day of surgery.    pantoprazole (PROTONIX) 40 mg tablet Take day of surgery.    spironolactone (Aldactone) 100 mg tablet Hold day of surgery.     Spoke with pt via phone.    Medication instructions for day surgery reviewed. Please use only a sip of water to take your instructed medications. Avoid all over the counter vitamins, supplements and NSAIDS for one week prior to surgery per anesthesia guidelines. Tylenol is ok to take as needed.     You will receive a call one business day prior to surgery with an arrival time and hospital directions. If your surgery is scheduled on a Monday, the hospital will be calling you on the Friday prior to your surgery. If you have not heard from anyone by 8pm, please call the hospital supervisor through the hospital  at [phone]. (Juwan [phone]).    Do not eat or drink anything after midnight the night before your surgery, including candy, mints, lifesavers, or chewing gum. Do not drink alcohol 24hrs before your surgery. Try not to smoke at least 24hrs before your surgery.       Follow the pre surgery showering instructions as listed in the “My Surgical Experience Booklet” or otherwise provided by your surgeon's office. Do not use a blade to shave the surgical area 1 week before surgery. It is okay to use a clean electric clippers up to 24 hours before surgery. Do not apply any lotions, creams, including makeup, cologne, deodorant, or perfumes after showering on the day of your surgery. Do not use dry shampoo, hair spray, hair gel, or any type of hair products.     No contact lenses, eye  make-up, or artificial eyelashes. Remove nail polish, including gel polish, and any artificial, gel, or acrylic nails if possible. Remove all jewelry including rings and body piercing jewelry.     Wear causal clothing that is easy to take on and off. Consider your type of surgery.    Keep any valuables, jewelry, piercings at home. Please bring any specially ordered equipment (sling, braces) if indicated.    Arrange for a responsible person to drive you to and from the hospital on the day of your surgery. Visitor Guidelines discussed.     Call the surgeon's office with any new illnesses, exposures, or additional questions prior to surgery.    Please reference your “My Surgical Experience Booklet” for additional information to prepare for your upcoming surgery.

## 2024-01-26 NOTE — PROGRESS NOTES
Procedure type:    ___x__ultrasound guided _____stereotactic    Breast:    _____Left ___x__Right    Location:  12 o'clock Retroareolar    Needle:16G     # of passes:1    Clip: 7.5 cm Hortencia  Reflector    Performed by:Dr. Romo    Pressure held for 5 minutes by:Magalis Al Strips:    ____yes __x___no    Band aid:    __X___yes_____no    Tolerated procedure:    __X___yes _____no

## 2024-01-29 NOTE — PROGRESS NOTES
Post procedure call completed    Bleeding: _____yes __X___no (Pt denies)    Pain: _____yes ___X___no (Pt denies)    Redness/Swelling: ______yes ___X___no (Pt denies)    Band aid removed: __X___yes _____no        Pt with no questions at this time,  adv to call with any questions or concerns, has name/# for contact

## 2024-02-06 ENCOUNTER — ANESTHESIA (OUTPATIENT)
Dept: PERIOP | Facility: HOSPITAL | Age: 69
End: 2024-02-06
Payer: MEDICARE

## 2024-02-06 ENCOUNTER — APPOINTMENT (OUTPATIENT)
Dept: RADIOLOGY | Facility: HOSPITAL | Age: 69
End: 2024-02-06
Payer: MEDICARE

## 2024-02-06 ENCOUNTER — HOSPITAL ENCOUNTER (OUTPATIENT)
Facility: HOSPITAL | Age: 69
Setting detail: OUTPATIENT SURGERY
Discharge: HOME | End: 2024-02-06
Attending: SURGERY | Admitting: SURGERY
Payer: MEDICARE

## 2024-02-06 ENCOUNTER — ANESTHESIA EVENT (OUTPATIENT)
Dept: PERIOP | Facility: HOSPITAL | Age: 69
End: 2024-02-06
Payer: MEDICARE

## 2024-02-06 VITALS
HEART RATE: 70 BPM | TEMPERATURE: 97.3 F | RESPIRATION RATE: 15 BRPM | SYSTOLIC BLOOD PRESSURE: 154 MMHG | OXYGEN SATURATION: 98 % | DIASTOLIC BLOOD PRESSURE: 70 MMHG

## 2024-02-06 DIAGNOSIS — N60.91 ATYPICAL DUCTAL HYPERPLASIA OF RIGHT BREAST: ICD-10-CM

## 2024-02-06 PROCEDURE — 88341 IMHCHEM/IMCYTCHM EA ADD ANTB: CPT | Performed by: PATHOLOGY

## 2024-02-06 PROCEDURE — 19301 PARTIAL MASTECTOMY: CPT | Performed by: SURGERY

## 2024-02-06 PROCEDURE — 76098 X-RAY EXAM SURGICAL SPECIMEN: CPT | Performed by: SURGERY

## 2024-02-06 PROCEDURE — 88342 IMHCHEM/IMCYTCHM 1ST ANTB: CPT | Performed by: PATHOLOGY

## 2024-02-06 PROCEDURE — 88307 TISSUE EXAM BY PATHOLOGIST: CPT | Performed by: PATHOLOGY

## 2024-02-06 RX ORDER — HYDROMORPHONE HCL/PF 1 MG/ML
0.5 SYRINGE (ML) INJECTION
Status: DISCONTINUED | OUTPATIENT
Start: 2024-02-06 | End: 2024-02-06 | Stop reason: HOSPADM

## 2024-02-06 RX ORDER — DEXAMETHASONE SODIUM PHOSPHATE 10 MG/ML
INJECTION, SOLUTION INTRAMUSCULAR; INTRAVENOUS AS NEEDED
Status: DISCONTINUED | OUTPATIENT
Start: 2024-02-06 | End: 2024-02-06

## 2024-02-06 RX ORDER — PROPOFOL 10 MG/ML
INJECTION, EMULSION INTRAVENOUS CONTINUOUS PRN
Status: DISCONTINUED | OUTPATIENT
Start: 2024-02-06 | End: 2024-02-06

## 2024-02-06 RX ORDER — SODIUM CHLORIDE, SODIUM LACTATE, POTASSIUM CHLORIDE, CALCIUM CHLORIDE 600; 310; 30; 20 MG/100ML; MG/100ML; MG/100ML; MG/100ML
100 INJECTION, SOLUTION INTRAVENOUS CONTINUOUS
Status: DISCONTINUED | OUTPATIENT
Start: 2024-02-06 | End: 2024-02-06 | Stop reason: HOSPADM

## 2024-02-06 RX ORDER — CEFAZOLIN SODIUM 2 G/50ML
2000 SOLUTION INTRAVENOUS ONCE
Status: COMPLETED | OUTPATIENT
Start: 2024-02-06 | End: 2024-02-06

## 2024-02-06 RX ORDER — FENTANYL CITRATE 50 UG/ML
INJECTION, SOLUTION INTRAMUSCULAR; INTRAVENOUS AS NEEDED
Status: DISCONTINUED | OUTPATIENT
Start: 2024-02-06 | End: 2024-02-06

## 2024-02-06 RX ORDER — ONDANSETRON 2 MG/ML
INJECTION INTRAMUSCULAR; INTRAVENOUS AS NEEDED
Status: DISCONTINUED | OUTPATIENT
Start: 2024-02-06 | End: 2024-02-06

## 2024-02-06 RX ORDER — DIPHENHYDRAMINE HYDROCHLORIDE 50 MG/ML
12.5 INJECTION INTRAMUSCULAR; INTRAVENOUS ONCE AS NEEDED
Status: DISCONTINUED | OUTPATIENT
Start: 2024-02-06 | End: 2024-02-06 | Stop reason: HOSPADM

## 2024-02-06 RX ORDER — LIDOCAINE HYDROCHLORIDE 10 MG/ML
INJECTION, SOLUTION EPIDURAL; INFILTRATION; INTRACAUDAL; PERINEURAL AS NEEDED
Status: DISCONTINUED | OUTPATIENT
Start: 2024-02-06 | End: 2024-02-06

## 2024-02-06 RX ORDER — KETOROLAC TROMETHAMINE 30 MG/ML
INJECTION, SOLUTION INTRAMUSCULAR; INTRAVENOUS AS NEEDED
Status: DISCONTINUED | OUTPATIENT
Start: 2024-02-06 | End: 2024-02-06

## 2024-02-06 RX ORDER — ONDANSETRON 2 MG/ML
4 INJECTION INTRAMUSCULAR; INTRAVENOUS ONCE AS NEEDED
Status: DISCONTINUED | OUTPATIENT
Start: 2024-02-06 | End: 2024-02-06 | Stop reason: HOSPADM

## 2024-02-06 RX ORDER — FENTANYL CITRATE/PF 50 MCG/ML
25 SYRINGE (ML) INJECTION
Status: DISCONTINUED | OUTPATIENT
Start: 2024-02-06 | End: 2024-02-06 | Stop reason: HOSPADM

## 2024-02-06 RX ORDER — GLYCOPYRROLATE 0.2 MG/ML
INJECTION INTRAMUSCULAR; INTRAVENOUS AS NEEDED
Status: DISCONTINUED | OUTPATIENT
Start: 2024-02-06 | End: 2024-02-06

## 2024-02-06 RX ORDER — MAGNESIUM HYDROXIDE 1200 MG/15ML
LIQUID ORAL AS NEEDED
Status: DISCONTINUED | OUTPATIENT
Start: 2024-02-06 | End: 2024-02-06 | Stop reason: HOSPADM

## 2024-02-06 RX ORDER — OXYCODONE HYDROCHLORIDE AND ACETAMINOPHEN 5; 325 MG/1; MG/1
1 TABLET ORAL EVERY 4 HOURS PRN
Status: DISCONTINUED | OUTPATIENT
Start: 2024-02-06 | End: 2024-02-06 | Stop reason: HOSPADM

## 2024-02-06 RX ORDER — PROPOFOL 10 MG/ML
INJECTION, EMULSION INTRAVENOUS AS NEEDED
Status: DISCONTINUED | OUTPATIENT
Start: 2024-02-06 | End: 2024-02-06

## 2024-02-06 RX ORDER — SODIUM CHLORIDE, SODIUM LACTATE, POTASSIUM CHLORIDE, CALCIUM CHLORIDE 600; 310; 30; 20 MG/100ML; MG/100ML; MG/100ML; MG/100ML
INJECTION, SOLUTION INTRAVENOUS CONTINUOUS PRN
Status: DISCONTINUED | OUTPATIENT
Start: 2024-02-06 | End: 2024-02-06

## 2024-02-06 RX ORDER — BUPIVACAINE HYDROCHLORIDE 2.5 MG/ML
INJECTION, SOLUTION EPIDURAL; INFILTRATION; INTRACAUDAL AS NEEDED
Status: DISCONTINUED | OUTPATIENT
Start: 2024-02-06 | End: 2024-02-06 | Stop reason: HOSPADM

## 2024-02-06 RX ORDER — MIDAZOLAM HYDROCHLORIDE 2 MG/2ML
INJECTION, SOLUTION INTRAMUSCULAR; INTRAVENOUS AS NEEDED
Status: DISCONTINUED | OUTPATIENT
Start: 2024-02-06 | End: 2024-02-06

## 2024-02-06 RX ADMIN — PROPOFOL 200 MG: 10 INJECTION, EMULSION INTRAVENOUS at 11:34

## 2024-02-06 RX ADMIN — PROPOFOL 100 MCG/KG/MIN: 10 INJECTION, EMULSION INTRAVENOUS at 11:36

## 2024-02-06 RX ADMIN — LIDOCAINE HYDROCHLORIDE 50 MG: 10 INJECTION, SOLUTION EPIDURAL; INFILTRATION; INTRACAUDAL; PERINEURAL at 11:34

## 2024-02-06 RX ADMIN — DEXAMETHASONE SODIUM PHOSPHATE 10 MG: 10 INJECTION INTRAMUSCULAR; INTRAVENOUS at 11:36

## 2024-02-06 RX ADMIN — FENTANYL CITRATE 50 MCG: 50 INJECTION INTRAMUSCULAR; INTRAVENOUS at 12:33

## 2024-02-06 RX ADMIN — CEFAZOLIN SODIUM 2000 MG: 2 SOLUTION INTRAVENOUS at 11:31

## 2024-02-06 RX ADMIN — FENTANYL CITRATE 50 MCG: 50 INJECTION INTRAMUSCULAR; INTRAVENOUS at 11:50

## 2024-02-06 RX ADMIN — SODIUM CHLORIDE, SODIUM LACTATE, POTASSIUM CHLORIDE, AND CALCIUM CHLORIDE: .6; .31; .03; .02 INJECTION, SOLUTION INTRAVENOUS at 11:28

## 2024-02-06 RX ADMIN — KETOROLAC TROMETHAMINE 15 MG: 30 INJECTION, SOLUTION INTRAMUSCULAR; INTRAVENOUS at 12:33

## 2024-02-06 RX ADMIN — FENTANYL CITRATE 50 MCG: 50 INJECTION INTRAMUSCULAR; INTRAVENOUS at 12:14

## 2024-02-06 RX ADMIN — GLYCOPYRROLATE 0.2 MG: 0.2 INJECTION INTRAMUSCULAR; INTRAVENOUS at 11:34

## 2024-02-06 RX ADMIN — FENTANYL CITRATE 25 MCG: 50 INJECTION INTRAMUSCULAR; INTRAVENOUS at 12:00

## 2024-02-06 RX ADMIN — FENTANYL CITRATE 25 MCG: 50 INJECTION INTRAMUSCULAR; INTRAVENOUS at 11:34

## 2024-02-06 RX ADMIN — OXYCODONE HYDROCHLORIDE AND ACETAMINOPHEN 1 TABLET: 5; 325 TABLET ORAL at 13:50

## 2024-02-06 RX ADMIN — ONDANSETRON 4 MG: 2 INJECTION INTRAMUSCULAR; INTRAVENOUS at 11:36

## 2024-02-06 RX ADMIN — MIDAZOLAM 2 MG: 1 INJECTION INTRAMUSCULAR; INTRAVENOUS at 11:28

## 2024-02-06 NOTE — DISCHARGE INSTR - AVS FIRST PAGE
POST-OPERATIVE BREAST CARE INSTRUCTIONS    Wound Closure/Dressing:            Your wound is closed with:   surgical glue, it will come off after 2-3 weeks.         Dissolvable sutures-underneath the skin    Wound care:    If you have gauze over your wound you may remove it the day after surgery. Leave the Steri-strips on, they will fall off on their own in 1-2 weeks  You may shower using soap and water to clean your wound. Gently pat dry.                  Drain Care:  If you do not have a drain, disregard this section.    Visiting Nursing should have been arranged upon discharge from hospital.  A nurse will call your home to schedule an initial visit.  Please call the number below if you have not received a call within 48 hours of hospital discharge.   You may shower with the VIVEK drains.  Wash area around the drains with soap and water and pat dry.  Record drain outputs on chart given to you at pre op visit and bring that chart to office at post op appt  VIVEK drains can be removed in the office by a nurse either at post op visit OR when drain output is 30 cc’s or less in a 24 hour period for three days in a row.  If you had plastic surgery or reconstructive surgery, the plastic surgeon will manage the drains.    Other:      You can begin wearing your own bra when it feels comfortable.    You may resume using deodorant the day after surgery                 Post-op visit:  your post-op visit is scheduled for:  Feb 21, 2024 @ 2:30 PM    Call our office at [phone] if you have any  of the following:    Redness, swelling, heat, unusual drainage or heavy bleeding from wound.  Fever greater than 101.°  Pain not relieved by medication.

## 2024-02-06 NOTE — ANESTHESIA PREPROCEDURE EVALUATION
"Procedure:  BREAST LILLY  DIRECTED LUMPECTOMY (Right: Breast)    Relevant Problems   CARDIO   (+) Essential hypertension   (+) Hyperlipidemia      GI/HEPATIC   (+) Esophageal dysphagia      Other   (+) Morbid obesity (HCC)        Prev mac3 gr1v    Recent labs personally reviewed:  Lab Results   Component Value Date    WBC 5.73 01/12/2024    HGB 14.4 01/12/2024     01/12/2024     Lab Results   Component Value Date     08/13/2015    K 4.2 01/12/2024    BUN 19 01/12/2024    CREATININE 0.91 01/12/2024    GLUCOSE 99 08/13/2015     No results found for: \"PTT\"   No results found for: \"INR\"    Lab Results   Component Value Date    HGBA1C 5.9 (H) 05/13/2023             Physical Exam    Airway    Mallampati score: II  TM Distance: >3 FB  Neck ROM: full     Dental   No notable dental hx     Cardiovascular      Pulmonary      Other Findings        Anesthesia Plan  ASA Score- 3     Anesthesia Type- general with ASA Monitors.         Additional Monitors:     Airway Plan: LMA.           Plan Factors-Exercise tolerance (METS): >4 METS.    Chart reviewed.   Existing labs reviewed. Patient summary reviewed.    Patient is not a current smoker.              Induction- intravenous.    Postoperative Plan-     Informed Consent- Anesthetic plan and risks discussed with patient.  I personally reviewed this patient with the CRNA. Discussed and agreed on the Anesthesia Plan with the CRNA..                "

## 2024-02-06 NOTE — ANESTHESIA POSTPROCEDURE EVALUATION
Post-Op Assessment Note    CV Status:  Stable  Pain Score: 0    Pain management: adequate       Mental Status:  Alert and awake   Hydration Status:  Euvolemic   PONV Controlled:  Controlled   Airway Patency:  Patent     Post Op Vitals Reviewed: Yes    No anethesia notable event occurred.    Staff: CRNA               BP   174/95   Temp   97.1   Pulse   84   Resp   12   SpO2   96% RA

## 2024-02-06 NOTE — OP NOTE
OPERATIVE REPORT  PATIENT NAME: Abi Lomax    :  1955  MRN: [id number]  Pt Location: [facility]    SURGERY DATE: 2024    Surgeons and Role:     * Matias Mendoza MD - Primary     * Isis Lowe MD - Assisting    Preop Diagnosis:  Atypical ductal hyperplasia of right breast [N60.91]    Post-Op Diagnosis Codes:     * Atypical ductal hyperplasia of right breast [N60.91]    Procedure(s):  Right - BREAST LILLY  DIRECTED LUMPECTOMY    Specimen(s):  ID Type Source Tests Collected by Time Destination   1 : RIGHT BREAST LUMPECTOMY MARKED PER PROTOCOL Tissue Breast, Right TISSUE EXAM Matias Mendoza MD 2024 1030    2 : RIGHT BREAST LUMPECTOMY, NEW SUPERIOR MARGIN- SEE COMMENTS Tissue Breast, Right TISSUE EXAM Matias Mendoza MD 2024 1225    3 : RIGHT BREAST LUMPECTOMY, NEW POSTERIOR MARGIN- SEE COMMENTS Tissue Breast, Right TISSUE EXAM Matias Mendoza MD 2024 1226        Estimated Blood Loss:   Minimal    Drains:  * No LDAs found *    Anesthesia Type:   General    Operative Indications:  Atypical ductal hyperplasia of right breast [N60.91]      Operative Findings:  Good specimen radiograph    Complications:   None    Procedure and Technique:  I previously reviewed the post biopsy images.      Lumpectomy  The patient was brought to the operation room and placed under general anesthesia.   Attention was paid to ensure appropriate padding and correct positioning.  The LILLY  detector was then used to locate the position of the LILLY deflector and the skin was marked in this area.  The [facility] breast was prepped and draped in a sterile fashion.  I initiated a time-out, identifying the patient, the correct side and the above procedure.  All parties agreed with the time out.     The planned incision was then injected with 0.25% Marcaine for local anesthesia.  The incision was sharply incised.  The LILLY dectector was used to guide the dissection.  Superior, inferior, medial and lateral planes were developed  around the LILLY deflector and the specimen truncated with electrocautery beyond the deflector.  The specimen was then inked for orientation purposes.  A specimen radiograph was taken in two dimensions.   Additional margins were removed to optimize complete removal of the area.  The additional margins were inked on the most distal area.  All specimens were sent to pathology for permanent analysis.  Superficial bleeding controlled with electrocautery and the wound was extensively irrigated.  The wound was closed with two layers, an inner layer of 3-0 Monocyl and a subcuticular layer of 4-0 PDS.  Exofin was applied.  The counts were correct x 2.    I was present for the entire procedure.    Patient Disposition:  PACU     This procedure was not performed to treat breast cancer through sentinel node biopsy      SIGNATURE: Matias Mendoza MD  DATE: February 6, 2024  TIME: 12:32 PM

## 2024-02-06 NOTE — INTERVAL H&P NOTE
Hortencia functional  H&P reviewed. After examining the patient I find no changes in the patients condition since the H&P had been written.    Vitals:    02/06/24 0944   BP: (!) 195/80   Pulse: 58   Resp: 18   Temp: (!) 96.7 °F (35.9 °C)   SpO2: 96%

## 2024-02-12 PROCEDURE — 88307 TISSUE EXAM BY PATHOLOGIST: CPT | Performed by: PATHOLOGY

## 2024-02-12 PROCEDURE — 88341 IMHCHEM/IMCYTCHM EA ADD ANTB: CPT | Performed by: PATHOLOGY

## 2024-02-12 PROCEDURE — 88342 IMHCHEM/IMCYTCHM 1ST ANTB: CPT | Performed by: PATHOLOGY

## 2024-02-21 ENCOUNTER — OFFICE VISIT (OUTPATIENT)
Dept: SURGICAL ONCOLOGY | Facility: CLINIC | Age: 69
End: 2024-02-21

## 2024-02-21 VITALS
HEART RATE: 81 BPM | WEIGHT: 253 LBS | OXYGEN SATURATION: 96 % | DIASTOLIC BLOOD PRESSURE: 84 MMHG | SYSTOLIC BLOOD PRESSURE: 138 MMHG | RESPIRATION RATE: 16 BRPM | BODY MASS INDEX: 44.83 KG/M2 | TEMPERATURE: 97.3 F | HEIGHT: 63 IN

## 2024-02-21 DIAGNOSIS — Z98.890 STATUS POST RIGHT BREAST LUMPECTOMY: ICD-10-CM

## 2024-02-21 DIAGNOSIS — Z91.89 INCREASED RISK OF BREAST CANCER: ICD-10-CM

## 2024-02-21 DIAGNOSIS — Z71.89 OTHER SPECIFIED COUNSELING: Primary | ICD-10-CM

## 2024-02-21 DIAGNOSIS — N60.91 ATYPICAL DUCTAL HYPERPLASIA OF RIGHT BREAST: ICD-10-CM

## 2024-02-21 PROBLEM — Z12.11 COLON CANCER SCREENING: Status: RESOLVED | Noted: 2020-04-29 | Resolved: 2024-02-21

## 2024-02-21 PROCEDURE — 99024 POSTOP FOLLOW-UP VISIT: CPT | Performed by: SURGERY

## 2024-02-21 RX ORDER — CLINDAMYCIN HYDROCHLORIDE 300 MG/1
300 CAPSULE ORAL 3 TIMES DAILY
Qty: 21 CAPSULE | Refills: 0 | Status: SHIPPED | OUTPATIENT
Start: 2024-02-21 | End: 2024-02-28

## 2024-02-21 NOTE — PROGRESS NOTES
Surgical Oncology Breast Post-Op       [facility] [place]  [address]  [place] [address]    Abi Lomax  1955  [id number]  [facility] [place]  [address]  [place] [address]    Chief Complaint:   Abi is seen for a post-operative visit of her recent breast surgery.      Oncology History:     Oncology History    No history exists.       Assessment & Plan:   Assessment/Plan    Patient presents for postoperative visit.  Her pathology demonstrated focal ADH with no carcinoma.  Her TC 8 risk was 23% it was only 16% over the next 10 years.  We talked about possible supplemental screening however the patient preferred not to do MRIs.  Think 3D mammograms are appropriate.  Patient is in agreement.  She will follow-up with her PCP for annual mammography.  I did review being breast self-aware and to contact us should she appreciate any abnormal masses or contact us if she has any concerns.  From a postoperative perspective she does have a mild amount of erythema in the anterior aspect of her incision.  I recommended placing her on antibiotics.  Will coordinate that for her.  History of Present Illness:   See Oncology History    Interval History:   See Assessment & Plan    Review of Systems:   Review of Systems   All other systems reviewed and are negative.      Past Medical History:     Patient Active Problem List   Diagnosis    Palpitation    Hyperlipidemia    Essential hypertension    Food impaction of esophagus    Esophageal dysphagia    Morbid obesity (HCC)    Atypical ductal hyperplasia of right breast     Past Medical History:   Diagnosis Date    Anxiety     Arrhythmia     Bronchitis     CKD (chronic kidney disease)     Hyperlipidemia     Hypertension     Obesity      Past Surgical History:   Procedure Laterality Date    BREAST EXCISIONAL BIOPSY Left 1970    BREAST  LUMPECTOMY Right 2/6/2024    Procedure: BREAST LILLY  DIRECTED LUMPECTOMY;  Surgeon: Matias Mendoza MD;  [facility]: [facility];  Service: Surgical Oncology    DILATION AND CURETTAGE OF UTERUS      MAMMO STEREOTACTIC BREAST BIOPSY RIGHT (ALL INC) Right 12/26/2023    MAMMO STEREOTACTIC BREAST BIOPSY RIGHT (ALL INC) EACH ADD Right 12/26/2023    UPPER GASTROINTESTINAL ENDOSCOPY  3/10/2020     BREAST LILLY  NEEDLE LOC RIGHT Right 1/26/2024     Family History   Problem Relation Age of Onset    Breast cancer additional onset Mother     Breast cancer Mother 42    Diabetes Father     Kidney disease Father     No Known Problems Sister     Breast cancer Maternal Aunt 87    No Known Problems Maternal Aunt     No Known Problems Maternal Grandmother     No Known Problems Maternal Grandfather     No Known Problems Paternal Grandmother     No Known Problems Paternal Grandfather      Social History     Socioeconomic History    Marital status: Single     Spouse name: Not on file    Number of children: Not on file    Years of education: Not on file    Highest education level: Not on file   Occupational History    Not on file   Tobacco Use    Smoking status: Never    Smokeless tobacco: Never   Vaping Use    Vaping status: Never Used   Substance and Sexual Activity    Alcohol use: Never     Comment: pt reports maybe 3 drinks a year    Drug use: Never    Sexual activity: Never   Other Topics Concern    Not on file   Social History Narrative    Not on file     Social Determinants of Health     Financial Resource Strain: Low Risk  (9/15/2023)    Received from [facility]    Overall Financial Resource Strain (CARDIA)     Difficulty of Paying Living Expenses: Not hard at all   Food Insecurity: No Food Insecurity (9/15/2023)    Received from [facility]    Hunger Vital Sign     Worried About Running Out of Food in the Last Year: Never true     Ran Out of Food in the Last Year: Never true   Transportation  Needs: No Transportation Needs (9/15/2023)    Received from [facility]    PRAPARE - Transportation     Lack of Transportation (Medical): No     Lack of Transportation (Non-Medical): No   Physical Activity: Sufficiently Active (9/15/2023)    Received from [facility]    Exercise Vital Sign     Days of Exercise per Week: 4 days     Minutes of Exercise per Session: 40 min   Stress: No Stress Concern Present (9/15/2023)    Received from [facility]    [employer] [place] [employer] - Occupational Stress Questionnaire     Feeling of Stress : Not at all   Social Connections: Moderately Integrated (9/15/2023)    Received from [facility]    Social Connection and Isolation Panel [NHANES]     Frequency of Communication with Friends and Family: Twice a week     Frequency of Social Gatherings with Friends and Family: Three times a week     Attends Confucianist Services: 1 to 4 times per year     Active Member of Clubs or Organizations: Yes     Attends Club or Organization Meetings: More than 4 times per year     Marital Status: Never    Intimate Partner Violence: Not At Risk (9/15/2023)    Received from [facility]    Humiliation, Afraid, Rape, and Kick questionnaire     Fear of Current or Ex-Partner: No     Emotionally Abused: No     Physically Abused: No     Sexually Abused: No   Housing Stability: Low Risk  (9/15/2023)    Received from [facility]    Housing Stability Vital Sign     Unable to Pay for Housing in the Last Year: No     Number of Places Lived in the Last Year: 1     Unstable Housing in the Last Year: No       Current Outpatient Medications:     atorvastatin (LIPITOR) 10 mg tablet, Take 1 tablet (10 mg total) by mouth daily, Disp: 90 tablet, Rfl: 0    Cholecalciferol 25 MCG (1000 UT) tablet, Take 1,000 Units by mouth daily, Disp: , Rfl:     fexofenadine (ALLEGRA) 60 MG tablet, Take 60 mg by mouth  daily, Disp: , Rfl:     hydrochlorothiazide (HYDRODIURIL) 25 mg tablet, TAKE 1 TABLET(25 MG) BY MOUTH DAILY (Patient taking differently: Take 25 mg by mouth once a week), Disp: 90 tablet, Rfl: 0    nadolol (CORGARD) 20 mg tablet, Take 1 tablet (20 mg total) by mouth daily, Disp: 90 tablet, Rfl: 0    pantoprazole (PROTONIX) 40 mg tablet, Take 1 tablet (40 mg total) by mouth daily, Disp: 90 tablet, Rfl: 0    spironolactone (Aldactone) 100 mg tablet, Take 1 tablet (100 mg total) by mouth 2 (two) times a day, Disp: 180 tablet, Rfl: 0    oxyCODONE-acetaminophen (Percocet) 5-325 mg per tablet, Take 1 tablet by mouth every 6 (six) hours as needed for moderate pain Max Daily Amount: 4 tablets (Patient not taking: Reported on 2/21/2024), Disp: 6 tablet, Rfl: 0  Allergies   Allergen Reactions    Amoxicillin-Pot Clavulanate     Cefprozil Diarrhea, Nausea Only and Vomiting     Category: Allergy;     Sulfa Antibiotics Rash and Hives     Category: Allergy;   Category: Allergy;        Physical Exams:     Vitals:    02/21/24 1433   BP: 138/84   Pulse: 81   Resp: 16   Temp: (!) 97.3 °F (36.3 °C)   SpO2: 96%     Physical Exam  Chest:          Comments: Patient has a well-healed right breast incision though there is a mild amount of erythema.  Is quite faint it is also associated with some increased soreness over the last several days consequently I will recommend antibiotics.        Results & Discussion:     Above, I have recommended clindamycin since she is allergic to amoxicillin.  Patient will contact us if this area worsens.  Otherwise she will have 3D mammograms as long as she remains healthy.  Will see her back on as-needed basis.
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
## 2024-03-11 DIAGNOSIS — I10 ESSENTIAL HYPERTENSION: ICD-10-CM

## 2024-03-11 DIAGNOSIS — T18.128A FOOD IMPACTION OF ESOPHAGUS, INITIAL ENCOUNTER: ICD-10-CM

## 2024-03-11 DIAGNOSIS — E78.2 MIXED HYPERLIPIDEMIA: ICD-10-CM

## 2024-03-11 RX ORDER — ATORVASTATIN CALCIUM 10 MG/1
10 TABLET, FILM COATED ORAL DAILY
Qty: 90 TABLET | Refills: 0 | Status: SHIPPED | OUTPATIENT
Start: 2024-03-11

## 2024-03-11 RX ORDER — NADOLOL 20 MG/1
20 TABLET ORAL DAILY
Qty: 90 TABLET | Refills: 0 | Status: SHIPPED | OUTPATIENT
Start: 2024-03-11

## 2024-03-11 RX ORDER — PANTOPRAZOLE SODIUM 40 MG/1
40 TABLET, DELAYED RELEASE ORAL DAILY
Qty: 90 TABLET | Refills: 0 | Status: SHIPPED | OUTPATIENT
Start: 2024-03-11

## 2024-03-11 RX ORDER — SPIRONOLACTONE 100 MG/1
100 TABLET, FILM COATED ORAL 2 TIMES DAILY
Qty: 180 TABLET | Refills: 0 | Status: SHIPPED | OUTPATIENT
Start: 2024-03-11

## 2024-04-02 ENCOUNTER — OFFICE VISIT (OUTPATIENT)
Dept: URGENT CARE | Age: 69
End: 2024-04-02
Payer: MEDICARE

## 2024-04-02 VITALS
OXYGEN SATURATION: 98 % | RESPIRATION RATE: 20 BRPM | HEART RATE: 62 BPM | DIASTOLIC BLOOD PRESSURE: 87 MMHG | TEMPERATURE: 98.4 F | SYSTOLIC BLOOD PRESSURE: 136 MMHG

## 2024-04-02 DIAGNOSIS — H65.91 FLUID LEVEL BEHIND TYMPANIC MEMBRANE OF RIGHT EAR: Primary | ICD-10-CM

## 2024-04-02 PROCEDURE — 99213 OFFICE O/P EST LOW 20 MIN: CPT

## 2024-04-02 PROCEDURE — G0463 HOSPITAL OUTPT CLINIC VISIT: HCPCS

## 2024-04-02 RX ORDER — PREDNISONE 50 MG/1
50 TABLET ORAL DAILY
Qty: 5 TABLET | Refills: 0 | Status: SHIPPED | OUTPATIENT
Start: 2024-04-02 | End: 2024-04-07

## 2024-04-02 NOTE — PROGRESS NOTES
[facility]        NAME: Abi Lomax is a 68 y.o. female  : 1955    MRN: [id number]  DATE: 2024  TIME: 9:02 AM    Assessment and Plan   Fluid level behind tympanic membrane of right ear [H65.91]  1. Fluid level behind tympanic membrane of right ear  predniSONE 50 mg tablet            Patient Instructions     Use prednisone as prescribed  Avoid Q-Tip use  Tylenol/Ibuprofen for discomfort  Fluids  Change pillowcase daily  Follow up with PCP in 3-5 days.  Proceed to  ER if symptoms worsen.    If tests are performed, our office will contact you with results only if changes need to made to the care plan discussed with you at the visit. [facility]    Chief Complaint     Chief Complaint   Patient presents with    Earache     Patient states that for over 10 days she has had fluid in the ear and pressure. She has been taking decongestants to help but no relief.          History of Present Illness       Patient is a 69 yo female with significant PMH of HTN and seasonal allergies presenting in the clinic today for right ear pain x 10 days. Admits PND. Denies fever, chills, decreased hearing, ear discharge, sore throat, headache, dizziness, chest pain, SOB, n/v/d. Admits the use of OTC decongestants and Allegra for sx management. Denies recent sick contacts and recent URI sx.         Review of Systems   Review of Systems   Constitutional:  Negative for chills, fatigue and fever.   HENT:  Positive for ear pain and postnasal drip. Negative for congestion, ear discharge, rhinorrhea, sinus pressure, sinus pain and sore throat.    Respiratory:  Negative for cough and shortness of breath.    Cardiovascular:  Negative for chest pain.   Gastrointestinal:  Negative for abdominal pain, diarrhea, nausea and vomiting.   Musculoskeletal:  Negative for myalgias.   Skin:  Negative for rash.   Neurological:  Negative for headaches.         Current Medications        Current Outpatient Medications:     atorvastatin (LIPITOR) 10 mg tablet, Take 1 tablet (10 mg total) by mouth daily, Disp: 90 tablet, Rfl: 0    Cholecalciferol 25 MCG (1000 UT) tablet, Take 1,000 Units by mouth daily, Disp: , Rfl:     fexofenadine (ALLEGRA) 60 MG tablet, Take 60 mg by mouth daily, Disp: , Rfl:     nadolol (CORGARD) 20 mg tablet, Take 1 tablet (20 mg total) by mouth daily, Disp: 90 tablet, Rfl: 0    pantoprazole (PROTONIX) 40 mg tablet, Take 1 tablet (40 mg total) by mouth daily, Disp: 90 tablet, Rfl: 0    predniSONE 50 mg tablet, Take 1 tablet (50 mg total) by mouth daily for 5 days, Disp: 5 tablet, Rfl: 0    spironolactone (Aldactone) 100 mg tablet, Take 1 tablet (100 mg total) by mouth 2 (two) times a day, Disp: 180 tablet, Rfl: 0    hydrochlorothiazide (HYDRODIURIL) 25 mg tablet, TAKE 1 TABLET(25 MG) BY MOUTH DAILY (Patient not taking: Reported on 4/2/2024), Disp: 90 tablet, Rfl: 0    oxyCODONE-acetaminophen (Percocet) 5-325 mg per tablet, Take 1 tablet by mouth every 6 (six) hours as needed for moderate pain Max Daily Amount: 4 tablets (Patient not taking: Reported on 2/21/2024), Disp: 6 tablet, Rfl: 0    Current Allergies     Allergies as of 04/02/2024 - Reviewed 04/02/2024   Allergen Reaction Noted    Amoxicillin-pot clavulanate  05/11/2015    Cefprozil Diarrhea, Nausea Only, and Vomiting 05/08/2012    Sulfa antibiotics Rash and Hives 05/08/2012            The following portions of the patient's history were reviewed and updated as appropriate: allergies, current medications, past family history, past medical history, past social history, past surgical history and problem list.     Past Medical History:   Diagnosis Date    Anxiety     Arrhythmia     Bronchitis     CKD (chronic kidney disease)     Hyperlipidemia     Hypertension     Obesity        Past Surgical History:   Procedure Laterality Date    BREAST EXCISIONAL BIOPSY Left 1970    BREAST LUMPECTOMY Right 2/6/2024    Procedure:  BREAST LILLY  DIRECTED LUMPECTOMY;  Surgeon: Matias Mendoza MD;  Location: [facility];  Service: Surgical Oncology    DILATION AND CURETTAGE OF UTERUS      MAMMO STEREOTACTIC BREAST BIOPSY RIGHT (ALL INC) Right 12/26/2023    MAMMO STEREOTACTIC BREAST BIOPSY RIGHT (ALL INC) EACH ADD Right 12/26/2023    UPPER GASTROINTESTINAL ENDOSCOPY  3/10/2020    US BREAST CLIP NEEDLE LOC RIGHT Right 1/26/2024       Family History   Problem Relation Age of Onset    Breast cancer additional onset Mother     Breast cancer Mother 42    Diabetes Father     Kidney disease Father     No Known Problems Sister     Breast cancer Maternal Aunt 87    No Known Problems Maternal Aunt     No Known Problems Maternal Grandmother     No Known Problems Maternal Grandfather     No Known Problems Paternal Grandmother     No Known Problems Paternal Grandfather          Medications have been verified.        Objective   /87   Pulse 62   Temp 98.4 °F (36.9 °C)   Resp 20   SpO2 98%        Physical Exam     Physical Exam  Vitals reviewed.   Constitutional:       General: She is not in acute distress.     Appearance: Normal appearance. She is normal weight. She is not ill-appearing.   HENT:      Head: Normocephalic.      Right Ear: Hearing, ear canal and external ear normal. A middle ear effusion is present. There is no impacted cerumen. Tympanic membrane is not erythematous or bulging.      Left Ear: Hearing, tympanic membrane, ear canal and external ear normal.  No middle ear effusion. There is no impacted cerumen. Tympanic membrane is not erythematous or bulging.      Nose: Nose normal. No congestion or rhinorrhea.      Right Sinus: No maxillary sinus tenderness or frontal sinus tenderness.      Left Sinus: No maxillary sinus tenderness or frontal sinus tenderness.      Mouth/Throat:      Lips: Pink.      Mouth: Mucous membranes are moist.      Pharynx: Oropharynx is clear. Uvula midline. No pharyngeal swelling, oropharyngeal exudate, posterior  oropharyngeal erythema or uvula swelling.   Eyes:      General:         Right eye: No discharge.         Left eye: No discharge.      Conjunctiva/sclera: Conjunctivae normal.   Cardiovascular:      Rate and Rhythm: Normal rate and regular rhythm.      Pulses: Normal pulses.      Heart sounds: Normal heart sounds. No murmur heard.     No friction rub. No gallop.   Pulmonary:      Effort: Pulmonary effort is normal.      Breath sounds: Normal breath sounds. No wheezing, rhonchi or rales.   Musculoskeletal:      Cervical back: Normal range of motion and neck supple. No tenderness.   Lymphadenopathy:      Cervical: No cervical adenopathy.   Skin:     General: Skin is warm.      Findings: No rash.   Neurological:      Mental Status: She is alert.   Psychiatric:         Mood and Affect: Mood normal.         Behavior: Behavior normal.

## 2024-04-02 NOTE — PATIENT INSTRUCTIONS
Use prednisone as prescribed  Avoid Q-Tip use  Tylenol/Ibuprofen for discomfort  Fluids  Change pillowcase daily  Follow up with PCP in 3-5 days.  Proceed to ER if symptoms worsen.

## 2024-06-09 DIAGNOSIS — T18.128A FOOD IMPACTION OF ESOPHAGUS, INITIAL ENCOUNTER: ICD-10-CM

## 2024-06-09 DIAGNOSIS — E78.2 MIXED HYPERLIPIDEMIA: ICD-10-CM
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
## 2024-06-09 DIAGNOSIS — I10 ESSENTIAL HYPERTENSION: ICD-10-CM

## 2024-06-10 RX ORDER — NADOLOL 20 MG/1
20 TABLET ORAL DAILY
Qty: 90 TABLET | Refills: 1 | Status: SHIPPED | OUTPATIENT
Start: 2024-06-10

## 2024-06-10 RX ORDER — SPIRONOLACTONE 100 MG/1
100 TABLET, FILM COATED ORAL 2 TIMES DAILY
Qty: 180 TABLET | Refills: 0 | Status: SHIPPED | OUTPATIENT
Start: 2024-06-10

## 2024-06-10 RX ORDER — ATORVASTATIN CALCIUM 10 MG/1
10 TABLET, FILM COATED ORAL DAILY
Qty: 90 TABLET | Refills: 1 | Status: SHIPPED | OUTPATIENT
Start: 2024-06-10

## 2024-06-10 RX ORDER — PANTOPRAZOLE SODIUM 40 MG/1
40 TABLET, DELAYED RELEASE ORAL DAILY
Qty: 90 TABLET | Refills: 1 | Status: SHIPPED | OUTPATIENT
Start: 2024-06-10

## 2024-06-17 ENCOUNTER — OFFICE VISIT (OUTPATIENT)
Dept: CARDIOLOGY CLINIC | Facility: CLINIC | Age: 69
End: 2024-06-17
Payer: MEDICARE

## 2024-06-17 VITALS
DIASTOLIC BLOOD PRESSURE: 76 MMHG | HEIGHT: 63 IN | OXYGEN SATURATION: 96 % | WEIGHT: 248 LBS | HEART RATE: 72 BPM | BODY MASS INDEX: 43.94 KG/M2 | SYSTOLIC BLOOD PRESSURE: 126 MMHG

## 2024-06-17 DIAGNOSIS — I10 ESSENTIAL HYPERTENSION: Primary | ICD-10-CM

## 2024-06-17 DIAGNOSIS — E78.2 MIXED HYPERLIPIDEMIA: ICD-10-CM

## 2024-06-17 DIAGNOSIS — R00.2 PALPITATION: ICD-10-CM

## 2024-06-17 PROCEDURE — 99213 OFFICE O/P EST LOW 20 MIN: CPT | Performed by: INTERNAL MEDICINE

## 2024-06-17 NOTE — ASSESSMENT & PLAN NOTE
The patient has a history of palpitation that seems to be under control with current regimen of medications.

## 2024-06-17 NOTE — PATIENT INSTRUCTIONS
The patient is advised to continue present regimen.  She will be scheduled for follow-up TSH and lipid panel.

## 2024-06-17 NOTE — PROGRESS NOTES
Subjective:        Patient ID: Abi Lomax is a 68 y.o. female.    Chief Complaint:  The patient presented to this office for the purpose of cardiac follow-up.  She is known to have a history of hypertension and hyperlipidemia as well as symptoms of palpitation associated with premature beats.  The patient has been feeling well from the cardiac standpoint denying any symptoms of chest pain, shortness of breath, palpitation, dizziness or lightheadedness.  She has no significant lower extremity edema.      The following portions of the patient's history were reviewed and updated as appropriate: allergies, current medications, past family history, past medical history, past social history, past surgical history, and problem list.  Review of Systems   Constitutional: Negative.   Cardiovascular: Negative.    Respiratory: Negative.     Psychiatric/Behavioral: Negative.            Objective:     Physical Exam  Constitutional:       Appearance: Normal appearance.   HENT:      Head: Normocephalic and atraumatic.   Cardiovascular:      Rate and Rhythm: Normal rate and regular rhythm.   Musculoskeletal:      Right lower leg: No edema.      Left lower leg: No edema.   Skin:     General: Skin is warm and dry.   Neurological:      Mental Status: She is alert and oriented to person, place, and time.   Psychiatric:         Mood and Affect: Mood normal.         Behavior: Behavior normal.         Lab Review:   Lab Results   Component Value Date     08/13/2015    K 4.2 01/12/2024    K 3.7 08/13/2015     01/12/2024     08/13/2015    CO2 28 01/12/2024    CO2 26 08/13/2015    BUN 19 01/12/2024    BUN 17 08/13/2015    CREATININE 0.91 01/12/2024    CREATININE 0.98 08/13/2015    GLUCOSE 99 08/13/2015    CALCIUM 9.5 01/12/2024    CALCIUM 9.4 08/13/2015         Assessment:       1. Essential hypertension        2. Palpitation  Lipid Panel with Direct LDL reflex    TSH, 3rd generation      3. Mixed hyperlipidemia  Lipid  Panel with Direct LDL reflex    TSH, 3rd generation           Plan:       The patient was advised to continue present regimen.

## 2024-07-17 ENCOUNTER — APPOINTMENT (OUTPATIENT)
Dept: LAB | Age: 69
End: 2024-07-17
Payer: MEDICARE

## 2024-07-17 DIAGNOSIS — E28.2 POLYCYSTIC OVARIES: ICD-10-CM

## 2024-07-17 DIAGNOSIS — R00.2 PALPITATION: ICD-10-CM

## 2024-07-17 DIAGNOSIS — R73.03 DIABETES MELLITUS, LATENT: ICD-10-CM

## 2024-07-17 DIAGNOSIS — E78.2 MIXED HYPERLIPIDEMIA: ICD-10-CM

## 2024-07-17 LAB
ALBUMIN SERPL BCG-MCNC: 3.9 G/DL (ref 3.5–5)
ALP SERPL-CCNC: 53 U/L (ref 34–104)
ALT SERPL W P-5'-P-CCNC: 9 U/L (ref 7–52)
ANION GAP SERPL CALCULATED.3IONS-SCNC: 12 MMOL/L (ref 4–13)
AST SERPL W P-5'-P-CCNC: 13 U/L (ref 13–39)
BILIRUB SERPL-MCNC: 0.51 MG/DL (ref 0.2–1)
BUN SERPL-MCNC: 25 MG/DL (ref 5–25)
CALCIUM SERPL-MCNC: 9.7 MG/DL (ref 8.4–10.2)
CHLORIDE SERPL-SCNC: 105 MMOL/L (ref 96–108)
CHOLEST SERPL-MCNC: 148 MG/DL
CO2 SERPL-SCNC: 22 MMOL/L (ref 21–32)
CREAT SERPL-MCNC: 0.82 MG/DL (ref 0.6–1.3)
EST. AVERAGE GLUCOSE BLD GHB EST-MCNC: 114 MG/DL
GFR SERPL CREATININE-BSD FRML MDRD: 73 ML/MIN/1.73SQ M
GLUCOSE P FAST SERPL-MCNC: 105 MG/DL (ref 65–99)
HBA1C MFR BLD: 5.6 %
HDLC SERPL-MCNC: 47 MG/DL
LDLC SERPL CALC-MCNC: 82 MG/DL (ref 0–100)
POTASSIUM SERPL-SCNC: 4.2 MMOL/L (ref 3.5–5.3)
PROT SERPL-MCNC: 6.8 G/DL (ref 6.4–8.4)
SODIUM SERPL-SCNC: 139 MMOL/L (ref 135–147)
TRIGL SERPL-MCNC: 96 MG/DL
TSH SERPL DL<=0.05 MIU/L-ACNC: 2.47 UIU/ML (ref 0.45–4.5)

## 2024-07-17 PROCEDURE — 80061 LIPID PANEL: CPT

## 2024-07-17 PROCEDURE — 83036 HEMOGLOBIN GLYCOSYLATED A1C: CPT

## 2024-07-17 PROCEDURE — 80053 COMPREHEN METABOLIC PANEL: CPT

## 2024-07-17 PROCEDURE — 36415 COLL VENOUS BLD VENIPUNCTURE: CPT

## 2024-09-09 DIAGNOSIS — T18.128A FOOD IMPACTION OF ESOPHAGUS, INITIAL ENCOUNTER: ICD-10-CM

## 2024-09-09 DIAGNOSIS — I10 ESSENTIAL HYPERTENSION: ICD-10-CM

## 2024-09-09 DIAGNOSIS — E78.2 MIXED HYPERLIPIDEMIA: ICD-10-CM
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
## 2024-09-09 RX ORDER — PANTOPRAZOLE SODIUM 40 MG/1
40 TABLET, DELAYED RELEASE ORAL DAILY
Qty: 90 TABLET | Refills: 1 | Status: SHIPPED | OUTPATIENT
Start: 2024-09-09

## 2024-09-09 RX ORDER — ATORVASTATIN CALCIUM 10 MG/1
10 TABLET, FILM COATED ORAL DAILY
Qty: 90 TABLET | Refills: 1 | Status: SHIPPED | OUTPATIENT
Start: 2024-09-09

## 2024-09-09 RX ORDER — SPIRONOLACTONE 100 MG/1
100 TABLET, FILM COATED ORAL 2 TIMES DAILY
Qty: 180 TABLET | Refills: 1 | Status: SHIPPED | OUTPATIENT
Start: 2024-09-09

## 2024-09-09 RX ORDER — NADOLOL 20 MG/1
20 TABLET ORAL DAILY
Qty: 90 TABLET | Refills: 1 | Status: SHIPPED | OUTPATIENT
Start: 2024-09-09

## 2024-11-20 ENCOUNTER — HOSPITAL ENCOUNTER (OUTPATIENT)
Dept: RADIOLOGY | Age: 69
Discharge: HOME | End: 2024-11-20
Payer: MEDICARE

## 2024-11-20 VITALS — HEIGHT: 63 IN | WEIGHT: 248 LBS | BODY MASS INDEX: 43.94 KG/M2

## 2024-11-20 DIAGNOSIS — Z12.31 ENCOUNTER FOR SCREENING MAMMOGRAM FOR MALIGNANT NEOPLASM OF BREAST: ICD-10-CM

## 2024-11-20 PROCEDURE — 77067 SCR MAMMO BI INCL CAD: CPT

## 2024-11-20 PROCEDURE — 77063 BREAST TOMOSYNTHESIS BI: CPT

## 2024-12-08 DIAGNOSIS — E78.2 MIXED HYPERLIPIDEMIA: ICD-10-CM

## 2024-12-08 DIAGNOSIS — T18.128A FOOD IMPACTION OF ESOPHAGUS, INITIAL ENCOUNTER: ICD-10-CM

## 2024-12-08 DIAGNOSIS — I10 ESSENTIAL HYPERTENSION: ICD-10-CM
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
## 2024-12-10 RX ORDER — HYDROCHLOROTHIAZIDE 25 MG/1
25 TABLET ORAL DAILY
Qty: 90 TABLET | Refills: 0 | Status: SHIPPED | OUTPATIENT
Start: 2024-12-10

## 2024-12-10 RX ORDER — ATORVASTATIN CALCIUM 10 MG/1
10 TABLET, FILM COATED ORAL DAILY
Qty: 90 TABLET | Refills: 1 | Status: SHIPPED | OUTPATIENT
Start: 2024-12-10

## 2024-12-10 RX ORDER — PANTOPRAZOLE SODIUM 40 MG/1
40 TABLET, DELAYED RELEASE ORAL DAILY
Qty: 90 TABLET | Refills: 1 | Status: SHIPPED | OUTPATIENT
Start: 2024-12-10

## 2024-12-10 RX ORDER — NADOLOL 20 MG/1
20 TABLET ORAL DAILY
Qty: 90 TABLET | Refills: 1 | Status: SHIPPED | OUTPATIENT
Start: 2024-12-10

## 2024-12-10 RX ORDER — SPIRONOLACTONE 100 MG/1
100 TABLET, FILM COATED ORAL 2 TIMES DAILY
Qty: 180 TABLET | Refills: 1 | Status: SHIPPED | OUTPATIENT
Start: 2024-12-10

## 2024-12-16 ENCOUNTER — OFFICE VISIT (OUTPATIENT)
Dept: CARDIOLOGY CLINIC | Facility: CLINIC | Age: 69
End: 2024-12-16
Payer: MEDICARE

## 2024-12-16 VITALS
HEIGHT: 63 IN | DIASTOLIC BLOOD PRESSURE: 82 MMHG | SYSTOLIC BLOOD PRESSURE: 128 MMHG | OXYGEN SATURATION: 96 % | WEIGHT: 243 LBS | BODY MASS INDEX: 43.05 KG/M2 | HEART RATE: 66 BPM | RESPIRATION RATE: 16 BRPM

## 2024-12-16 DIAGNOSIS — I10 ESSENTIAL HYPERTENSION: Primary | ICD-10-CM

## 2024-12-16 DIAGNOSIS — R00.2 PALPITATION: ICD-10-CM

## 2024-12-16 DIAGNOSIS — E78.2 MIXED HYPERLIPIDEMIA: ICD-10-CM

## 2024-12-16 PROCEDURE — 99214 OFFICE O/P EST MOD 30 MIN: CPT | Performed by: INTERNAL MEDICINE

## 2024-12-16 NOTE — PROGRESS NOTES
Subjective:        Patient ID: Abi Lomax is a 69 y.o. female.    Chief Complaint:  The patient presented to this office for the purpose of cardiac follow-up.  She is known to have a history of hypertension and hyperlipidemia.  She has been feeling well.  She denies any symptom of chest pain or shortness of breath.  She denies any symptoms of palpitation, dizziness or syncope.  She has chronic mild lower extremity edema that has been stable.      The following portions of the patient's history were reviewed and updated as appropriate: allergies, current medications, past family history, past medical history, past social history, past surgical history, and problem list.  Review of Systems   Constitutional: Negative.   Cardiovascular: Negative.    Respiratory: Negative.     Psychiatric/Behavioral: Negative.            Objective:     Physical Exam  Vitals reviewed.   Constitutional:       Appearance: Normal appearance.   HENT:      Head: Normocephalic and atraumatic.   Cardiovascular:      Rate and Rhythm: Normal rate and regular rhythm.      Heart sounds: Normal heart sounds.   Pulmonary:      Effort: Pulmonary effort is normal.   Musculoskeletal:      Right lower leg: Edema present.      Left lower leg: Edema present.   Skin:     General: Skin is warm and dry.   Neurological:      Mental Status: She is alert and oriented to person, place, and time.   Psychiatric:         Mood and Affect: Mood normal.         Behavior: Behavior normal.         Lab Review:   No visits with results within 2 Month(s) from this visit.   Latest known visit with results is:   Appointment on 07/17/2024   Component Date Value    Cholesterol 07/17/2024 148     Triglycerides 07/17/2024 96     HDL, Direct 07/17/2024 47 (L)     LDL Calculated 07/17/2024 82     Hemoglobin A1C 07/17/2024 5.6     EAG 07/17/2024 114     Sodium 07/17/2024 139     Potassium 07/17/2024 4.2     Chloride 07/17/2024 105     CO2 07/17/2024 22     ANION GAP 07/17/2024  12     BUN 07/17/2024 25     Creatinine 07/17/2024 0.82     Glucose, Fasting 07/17/2024 105 (H)     Calcium 07/17/2024 9.7     AST 07/17/2024 13     ALT 07/17/2024 9     Alkaline Phosphatase 07/17/2024 53     Total Protein 07/17/2024 6.8     Albumin 07/17/2024 3.9     Total Bilirubin 07/17/2024 0.51     eGFR 07/17/2024 73          Assessment:       1. Essential hypertension        2. Palpitation        3. Mixed hyperlipidemia        Hypertension, stable and adequately controlled.  The patient will continue nadolol.    History of palpitation with premature beats, stable.  The patient is essentially asymptomatic and the heart is regular at this time.    Hyperlipidemia, stable.  The patient will continue atorvastatin.     Plan:       The patient is advised to continue present regimen.
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
## 2025-06-09 DIAGNOSIS — I10 ESSENTIAL HYPERTENSION: ICD-10-CM

## 2025-06-09 DIAGNOSIS — E78.2 MIXED HYPERLIPIDEMIA: ICD-10-CM

## 2025-06-09 DIAGNOSIS — T18.128A FOOD IMPACTION OF ESOPHAGUS, INITIAL ENCOUNTER: ICD-10-CM

## 2025-06-10 RX ORDER — PANTOPRAZOLE SODIUM 40 MG/1
40 TABLET, DELAYED RELEASE ORAL DAILY
Qty: 90 TABLET | Refills: 1 | Status: SHIPPED | OUTPATIENT
Start: 2025-06-10

## 2025-06-10 RX ORDER — SPIRONOLACTONE 100 MG/1
100 TABLET, FILM COATED ORAL 2 TIMES DAILY
Qty: 180 TABLET | Refills: 1 | Status: SHIPPED | OUTPATIENT
Start: 2025-06-10

## 2025-06-10 RX ORDER — ATORVASTATIN CALCIUM 10 MG/1
10 TABLET, FILM COATED ORAL DAILY
Qty: 90 TABLET | Refills: 1 | Status: SHIPPED | OUTPATIENT
Start: 2025-06-10

## 2025-06-10 RX ORDER — NADOLOL 20 MG/1
20 TABLET ORAL DAILY
Qty: 90 TABLET | Refills: 1 | Status: SHIPPED | OUTPATIENT
Start: 2025-06-10

## 2025-06-16 ENCOUNTER — OFFICE VISIT (OUTPATIENT)
Dept: CARDIOLOGY CLINIC | Facility: CLINIC | Age: 70
End: 2025-06-16
Payer: MEDICARE

## 2025-06-16 VITALS
BODY MASS INDEX: 42.56 KG/M2 | HEIGHT: 63 IN | HEART RATE: 65 BPM | DIASTOLIC BLOOD PRESSURE: 85 MMHG | WEIGHT: 240.2 LBS | OXYGEN SATURATION: 95 % | SYSTOLIC BLOOD PRESSURE: 145 MMHG

## 2025-06-16 DIAGNOSIS — I10 ESSENTIAL HYPERTENSION: Primary | ICD-10-CM

## 2025-06-16 DIAGNOSIS — E78.2 MIXED HYPERLIPIDEMIA: ICD-10-CM

## 2025-06-16 PROCEDURE — 99214 OFFICE O/P EST MOD 30 MIN: CPT | Performed by: INTERNAL MEDICINE

## 2025-06-16 NOTE — PATIENT INSTRUCTIONS
The patient is advised to continue present regimen.  She will monitor her blood pressure at home and let me know if it is elevated.

## 2025-06-16 NOTE — PROGRESS NOTES
"Name: Abi Lomax      : 1955      MRN: [id number]  Encounter Provider: Savanah Villeda MD  Encounter Date: 2025   Encounter department: [facility] [place] [facility]  :  Assessment & Plan  Essential hypertension  Hypertension, slightly higher than desirable.  The patient will continue present regimen and she will monitor her blood pressure at home.  Orders:    CBC and differential; Future    Comprehensive metabolic panel; Future    Lipid Panel with Direct LDL reflex; Future    TSH, 3rd generation    Mixed hyperlipidemia  Hyperlipidemia, stable and adequately controlled.  The patient is due for follow-up lab.  Orders:    CBC and differential; Future    Comprehensive metabolic panel; Future    Lipid Panel with Direct LDL reflex; Future    TSH, 3rd generation        History of Present Illness   The patient presented to this office for the purpose of cardiac follow-up.  She is known to have history of hypertension and hyperlipidemia.  The patient has been feeling well from the cardiac standpoint denying any symptoms of chest pain or shortness of breath.  She denies any symptoms of palpitation, dizziness or syncope.  She has no significant lower extremity edema.      Abi Lomax is a 69 y.o. female   History obtained from: patient    Review of Systems   Constitutional: Negative.    Respiratory: Negative.     Cardiovascular: Negative.    Psychiatric/Behavioral: Negative.            Objective   /85 (BP Location: Left arm, Patient Position: Sitting, Cuff Size: Large)   Pulse 65   Ht 5' 3\" (1.6 m)   Wt 109 kg (240 lb 3.2 oz)   SpO2 95%   BMI 42.55 kg/m²      Physical Exam  Vitals reviewed.   Constitutional:       Appearance: Normal appearance.   HENT:      Head: Normocephalic and atraumatic.     Cardiovascular:      Rate and Rhythm: Normal rate and regular rhythm.   Pulmonary:      Effort: Pulmonary effort is normal.      Breath sounds: Normal breath sounds. "     Neurological:      Mental Status: She is alert.

## 2025-06-16 NOTE — ASSESSMENT & PLAN NOTE
Hypertension, slightly higher than desirable.  The patient will continue present regimen and she will monitor her blood pressure at home.  Orders:    CBC and differential; Future    Comprehensive metabolic panel; Future    Lipid Panel with Direct LDL reflex; Future    TSH, 3rd generation

## 2025-06-16 NOTE — ASSESSMENT & PLAN NOTE
Hyperlipidemia, stable and adequately controlled.  The patient is due for follow-up lab.  Orders:    CBC and differential; Future    Comprehensive metabolic panel; Future    Lipid Panel with Direct LDL reflex; Future    TSH, 3rd generation

## 2025-07-02 ENCOUNTER — APPOINTMENT (OUTPATIENT)
Dept: LAB | Age: 70
End: 2025-07-02
Attending: INTERNAL MEDICINE
Payer: MEDICARE

## 2025-07-02 DIAGNOSIS — E28.2 PCOS (POLYCYSTIC OVARIAN SYNDROME): ICD-10-CM

## 2025-07-02 DIAGNOSIS — I10 ESSENTIAL HYPERTENSION: ICD-10-CM

## 2025-07-02 DIAGNOSIS — E78.2 MIXED HYPERLIPIDEMIA: ICD-10-CM

## 2025-07-02 LAB
ALBUMIN SERPL BCG-MCNC: 4.1 G/DL (ref 3.5–5)
ALP SERPL-CCNC: 63 U/L (ref 34–104)
ALT SERPL W P-5'-P-CCNC: 10 U/L (ref 7–52)
ANION GAP SERPL CALCULATED.3IONS-SCNC: 13 MMOL/L (ref 4–13)
AST SERPL W P-5'-P-CCNC: 16 U/L (ref 13–39)
BASOPHILS # BLD AUTO: 0.03 THOUSANDS/ÂΜL (ref 0–0.1)
BASOPHILS NFR BLD AUTO: 0 % (ref 0–1)
BILIRUB SERPL-MCNC: 0.61 MG/DL (ref 0.2–1)
BUN SERPL-MCNC: 21 MG/DL (ref 5–25)
CALCIUM SERPL-MCNC: 9.3 MG/DL (ref 8.4–10.2)
CHLORIDE SERPL-SCNC: 102 MMOL/L (ref 96–108)
CHOLEST SERPL-MCNC: 146 MG/DL (ref ?–200)
CO2 SERPL-SCNC: 22 MMOL/L (ref 21–32)
CREAT SERPL-MCNC: 0.79 MG/DL (ref 0.6–1.3)
EOSINOPHIL # BLD AUTO: 0 THOUSAND/ÂΜL (ref 0–0.61)
EOSINOPHIL NFR BLD AUTO: 0 % (ref 0–6)
ERYTHROCYTE [DISTWIDTH] IN BLOOD BY AUTOMATED COUNT: 14.3 % (ref 11.6–15.1)
EST. AVERAGE GLUCOSE BLD GHB EST-MCNC: 120 MG/DL
GFR SERPL CREATININE-BSD FRML MDRD: 76 ML/MIN/1.73SQ M
GLUCOSE P FAST SERPL-MCNC: 96 MG/DL (ref 65–99)
HBA1C MFR BLD: 5.8 %
HCT VFR BLD AUTO: 44.9 % (ref 34.8–46.1)
HDLC SERPL-MCNC: 47 MG/DL
HGB BLD-MCNC: 14.5 G/DL (ref 11.5–15.4)
IMM GRANULOCYTES # BLD AUTO: 0.02 THOUSAND/UL (ref 0–0.2)
IMM GRANULOCYTES NFR BLD AUTO: 0 % (ref 0–2)
LDLC SERPL CALC-MCNC: 83 MG/DL (ref 0–100)
LYMPHOCYTES # BLD AUTO: 1.89 THOUSANDS/ÂΜL (ref 0.6–4.47)
LYMPHOCYTES NFR BLD AUTO: 28 % (ref 14–44)
MCH RBC QN AUTO: 28.9 PG (ref 26.8–34.3)
MCHC RBC AUTO-ENTMCNC: 32.3 G/DL (ref 31.4–37.4)
MCV RBC AUTO: 89 FL (ref 82–98)
MONOCYTES # BLD AUTO: 0.74 THOUSAND/ÂΜL (ref 0.17–1.22)
MONOCYTES NFR BLD AUTO: 11 % (ref 4–12)
NEUTROPHILS # BLD AUTO: 4 THOUSANDS/ÂΜL (ref 1.85–7.62)
NEUTS SEG NFR BLD AUTO: 61 % (ref 43–75)
NRBC BLD AUTO-RTO: 0 /100 WBCS
PLATELET # BLD AUTO: 231 THOUSANDS/UL (ref 149–390)
PMV BLD AUTO: 11.5 FL (ref 8.9–12.7)
POTASSIUM SERPL-SCNC: 3.9 MMOL/L (ref 3.5–5.3)
PROT SERPL-MCNC: 7.1 G/DL (ref 6.4–8.4)
RBC # BLD AUTO: 5.02 MILLION/UL (ref 3.81–5.12)
SODIUM SERPL-SCNC: 137 MMOL/L (ref 135–147)
TRIGL SERPL-MCNC: 79 MG/DL (ref ?–150)
TSH SERPL DL<=0.05 MIU/L-ACNC: 2.76 UIU/ML (ref 0.45–4.5)
WBC # BLD AUTO: 6.68 THOUSAND/UL (ref 4.31–10.16)

## 2025-07-02 PROCEDURE — 80061 LIPID PANEL: CPT

## 2025-07-02 PROCEDURE — 80053 COMPREHEN METABOLIC PANEL: CPT

## 2025-07-02 PROCEDURE — 85025 COMPLETE CBC W/AUTO DIFF WBC: CPT

## 2025-07-02 PROCEDURE — 36415 COLL VENOUS BLD VENIPUNCTURE: CPT

## 2025-07-02 PROCEDURE — 83036 HEMOGLOBIN GLYCOSYLATED A1C: CPT

## (undated) DEVICE — MARKER MARGIN 6 COLOR STANDARD

## (undated) DEVICE — PROVE COVER: Brand: UNBRANDED

## (undated) DEVICE — LIGACLIP MCA MULTIPLE CLIP APPLIERS, 20 SMALL CLIPS: Brand: LIGACLIP

## (undated) DEVICE — SUT PDS II 4-0 PS-2 18 IN Z496G

## (undated) DEVICE — HANDPIECE, SINGLE-USE, SAVI SCOUT®: Brand: SAVI SCOUT®

## (undated) DEVICE — DECANTER: Brand: UNBRANDED

## (undated) DEVICE — GLOVE SRG BIOGEL 7

## (undated) DEVICE — PENCIL ELECTROSURG E-Z CLEAN -0035H

## (undated) DEVICE — BETHLEHEM UNIVERSAL MINOR GEN: Brand: CARDINAL HEALTH

## (undated) DEVICE — CHLORAPREP HI-LITE 26ML ORANGE

## (undated) DEVICE — MEDI-VAC YANK SUCT HNDL W/TPRD BULBOUS TIP: Brand: CARDINAL HEALTH

## (undated) DEVICE — NEEDLE 25G X 1 1/2

## (undated) DEVICE — TUBING SUCTION 5MM X 12 FT

## (undated) DEVICE — SUT MONOCRYL 3-0 PS-2 18 IN Y497G

## (undated) DEVICE — ADHESIVE SKIN HIGH VISCOSITY EXOFIN 1ML

## (undated) DEVICE — SMOKE EVACUATION TUBING WITH 8 IN INTEGRAL WAND AND SPONGE GUARD: Brand: BUFFALO FILTER